# Patient Record
Sex: FEMALE | Race: WHITE | NOT HISPANIC OR LATINO | Employment: FULL TIME | URBAN - METROPOLITAN AREA
[De-identification: names, ages, dates, MRNs, and addresses within clinical notes are randomized per-mention and may not be internally consistent; named-entity substitution may affect disease eponyms.]

---

## 2017-03-03 ENCOUNTER — MYC MEDICAL ADVICE (OUTPATIENT)
Dept: FAMILY MEDICINE | Facility: CLINIC | Age: 42
End: 2017-03-03

## 2017-03-03 DIAGNOSIS — Z13.1 SCREENING FOR DIABETES MELLITUS: ICD-10-CM

## 2017-03-03 DIAGNOSIS — Z13.6 CARDIOVASCULAR SCREENING; LDL GOAL LESS THAN 160: Primary | ICD-10-CM

## 2017-03-03 NOTE — TELEPHONE ENCOUNTER
Writer responded as per below.    Lipids and glucose pended.    Routing to PCP.    Dr. Chiang-Please review and sign if agree.    Thank you!  TON VitaleN, RN

## 2017-03-09 ENCOUNTER — OFFICE VISIT (OUTPATIENT)
Dept: FAMILY MEDICINE | Facility: CLINIC | Age: 42
End: 2017-03-09
Payer: COMMERCIAL

## 2017-03-09 VITALS
SYSTOLIC BLOOD PRESSURE: 153 MMHG | WEIGHT: 228 LBS | BODY MASS INDEX: 37.99 KG/M2 | HEIGHT: 65 IN | RESPIRATION RATE: 16 BRPM | TEMPERATURE: 98.7 F | OXYGEN SATURATION: 97 % | DIASTOLIC BLOOD PRESSURE: 94 MMHG | HEART RATE: 81 BPM

## 2017-03-09 DIAGNOSIS — Z13.6 CARDIOVASCULAR SCREENING; LDL GOAL LESS THAN 160: ICD-10-CM

## 2017-03-09 DIAGNOSIS — Z00.00 ROUTINE GENERAL MEDICAL EXAMINATION AT A HEALTH CARE FACILITY: Primary | ICD-10-CM

## 2017-03-09 DIAGNOSIS — R03.0 ELEVATED BLOOD PRESSURE READING WITHOUT DIAGNOSIS OF HYPERTENSION: ICD-10-CM

## 2017-03-09 DIAGNOSIS — L91.8 SKIN TAG: ICD-10-CM

## 2017-03-09 DIAGNOSIS — E66.9 OBESITY, UNSPECIFIED OBESITY SEVERITY, UNSPECIFIED OBESITY TYPE: ICD-10-CM

## 2017-03-09 PROCEDURE — 99396 PREV VISIT EST AGE 40-64: CPT | Performed by: FAMILY MEDICINE

## 2017-03-09 NOTE — PATIENT INSTRUCTIONS
1. Schedule fasting lab draw.  2. Schedule with dermatology.    Preventive Health Recommendations  Female Ages 40 to 49    Yearly exam:     See your health care provider every year in order to  1. Review health changes.   2. Discuss preventive care.    3. Review your medicines if your doctor prescribed any.      Get a Pap test every three years (unless you have an abnormal result and your provider advises testing more often).      If you get Pap tests with HPV test, you only need to test every 5 years, unless you have an abnormal result. You do not need a Pap test if your uterus was removed (hysterectomy) and you have not had cancer.      You should be tested each year for STDs (sexually transmitted diseases), if you're at risk.       Ask your doctor if you should have a mammogram.      Have a colonoscopy (test for colon cancer) if someone in your family has had colon cancer or polyps before age 50.       Have a cholesterol test every 5 years.       Have a diabetes test (fasting glucose) after age 45. If you are at risk for diabetes, you should have this test every 3 years.    Shots: Get a flu shot each year. Get a tetanus shot every 10 years.     Nutrition:     Eat at least 5 servings of fruits and vegetables each day.    Eat whole-grain bread, whole-wheat pasta and brown rice instead of white grains and rice.    Talk to your provider about Calcium and Vitamin D.     Lifestyle    Exercise at least 150 minutes a week (an average of 30 minutes a day, 5 days a week). This will help you control your weight and prevent disease.    Limit alcohol to one drink per day.    No smoking.     Wear sunscreen to prevent skin cancer.    See your dentist every six months for an exam and cleaning.

## 2017-03-09 NOTE — NURSING NOTE
"Chief Complaint   Patient presents with     Physical       Initial /90 (BP Location: Right arm, Patient Position: Chair, Cuff Size: Adult Large)  Pulse 81  Temp 98.7  F (37.1  C) (Tympanic)  Resp 16  Ht 5' 4.5\" (1.638 m)  Wt 228 lb (103.4 kg)  LMP 02/25/2017  SpO2 97%  BMI 38.53 kg/m2 Estimated body mass index is 38.53 kg/(m^2) as calculated from the following:    Height as of this encounter: 5' 4.5\" (1.638 m).    Weight as of this encounter: 228 lb (103.4 kg).  Medication Reconciliation: complete       Mikala Patel MA     "

## 2017-03-09 NOTE — MR AVS SNAPSHOT
After Visit Summary   3/9/2017    Long Maradiaga    MRN: [id number]           Patient Information     Date Of Birth          1975        Visit Information        Provider Department      3/9/2017 11:20 AM Hailey Chiang MD [facility]        Today's Diagnoses     Routine general medical examination at a health care facility    -  1    CARDIOVASCULAR SCREENING; LDL GOAL LESS THAN 160        Obesity, unspecified obesity severity, unspecified obesity type        Skin tag          Care Instructions    1. Schedule fasting lab draw.  2. Schedule with dermatology.    Preventive Health Recommendations  Female Ages 40 to 49    Yearly exam:     See your health care provider every year in order to  1. Review health changes.   2. Discuss preventive care.    3. Review your medicines if your doctor prescribed any.      Get a Pap test every three years (unless you have an abnormal result and your provider advises testing more often).      If you get Pap tests with HPV test, you only need to test every 5 years, unless you have an abnormal result. You do not need a Pap test if your uterus was removed (hysterectomy) and you have not had cancer.      You should be tested each year for STDs (sexually transmitted diseases), if you're at risk.       Ask your doctor if you should have a mammogram.      Have a colonoscopy (test for colon cancer) if someone in your family has had colon cancer or polyps before age 50.       Have a cholesterol test every 5 years.       Have a diabetes test (fasting glucose) after age 45. If you are at risk for diabetes, you should have this test every 3 years.    Shots: Get a flu shot each year. Get a tetanus shot every 10 years.     Nutrition:     Eat at least 5 servings of fruits and vegetables each day.    Eat whole-grain bread, whole-wheat pasta and brown rice instead of white grains and rice.    Talk to your provider about Calcium and Vitamin D.     Lifestyle    Exercise at  least 150 minutes a week (an average of 30 minutes a day, 5 days a week). This will help you control your weight and prevent disease.    Limit alcohol to one drink per day.    No smoking.     Wear sunscreen to prevent skin cancer.    See your dentist every six months for an exam and cleaning.        Follow-ups after your visit        Additional Services     DERMATOLOGY REFERRAL       Your provider has referred you to: FMG: [facility] [place] [phone]   [email or web address]  FHN: [facility] [phone]   [email or web address]  FHN: [facility] [place] [phone]  [email or web address]  [facility] [phone]   [email or web address]  [facility] [place] [phone]   [email or web address]    Please be aware that coverage of these services is subject to the terms and limitations of your health insurance plan.  Call member services at your health plan with any benefit or coverage questions.      Please bring the following with you to your appointment:    (1) Any X-Rays, CTs or MRIs which have been performed.  Contact the facility where they were done to arrange for  prior to your scheduled appointment.    (2) List of current medications  (3) This referral request   (4) Any documents/labs given to you for this referral                  Your next 10 appointments already scheduled     Mar 10, 2017  7:45 AM CST   Lab visit with  LAB   [facility] ([facility])    [address] [place] [address] [phone]           Please do not eat 10-12 hours before your appointment if you are coming in fasting for labs on lipids, cholesterol, or glucose (sugar). Does not apply to pregnant women.  Water with medications is okay. Do not drink coffee or other fluids.  If  you have concerns about taking  your medications, please send a message by clicking on Secure Messaging, Message Your Care Team.            Mar 28, 2017  9:00 AM CDT   MA SCREENING DIGITAL BILATERAL with [id number]   [facility] ([facility])    [address]  [facility] [address] [phone]           Do not use any powder, lotion or deodorant under your arms or on your breast. If you do, we will ask you to remove it before your exam.  Wear comfortable, two-piece clothing.  If you have any allergies, tell your care team.  Bring any previous mammograms from other facilities or have them mailed to the breast center. This mammogram location, [facility], now offers 3D mammography. It doesn't replace a screening mammogram and can be done with a regular screening mammogram. It is optional and not all insurances will pay for it. 3D mammography is a special kind of mammogram that produces a three-dimensional image of the breast by using low dose-xrays. 3D allows the radiologist to see the breast tissue differently from 2D, which reduces the chance of repeat testing due to overlapping breast tissue. If you are interested in have a 3D mammogram, please check with your insurance before you arrive for your exam. On the day of your exam you will be asked if you would like 3D imaging.            Apr 11, 2017 10:45 AM CDT   Red Physical Adult with Rosie Lan MD   [facility] ([facility])    [address] [place] [address]  [address]  [facility] [address] [phone]              Who to contact     If you have questions or need follow up information about today's clinic visit or your schedule please contact [facility] directly at [phone].  Normal or non-critical lab and imaging results will be communicated to you by MyChart, letter or phone within 4 business days after the clinic has received the  "results. If you do not hear from us within 7 days, please contact the clinic through Everloop or phone. If you have a critical or abnormal lab result, we will notify you by phone as soon as possible.  Submit refill requests through Everloop or call your pharmacy and they will forward the refill request to us. Please allow 3 business days for your refill to be completed.          Additional Information About Your Visit        Snippetshar[employer]etate Information     Everloop gives you secure access to your electronic health record. If you see a primary care provider, you can also send messages to your care team and make appointments. If you have questions, please call your primary care clinic.  If you do not have a primary care provider, please call [phone] and they will assist you.        Care EveryWhere ID     This is your Care EveryWhere ID. This could be used by other organizations to access your [facility] medical records  [id number]        Your Vitals Were     Pulse Temperature Respirations Height Last Period Pulse Oximetry    81 98.7  F (37.1  C) (Tympanic) 16 5' 4.5\" (1.638 m) 02/25/2017 97%    BMI (Body Mass Index)                   38.53 kg/m2            Blood Pressure from Last 3 Encounters:   03/09/17 140/90   10/06/15 (!) 160/110   09/16/15 132/86    Weight from Last 3 Encounters:   03/09/17 228 lb (103.4 kg)   10/06/15 227 lb 6.4 oz (103.1 kg)   09/16/15 217 lb (98.4 kg)              We Performed the Following     DERMATOLOGY REFERRAL        Primary Care Provider Office Phone # Fax #    Hailey Chiang [phone]       [facility] [address]  [facility] [address]        Thank you!     Thank you for choosing [facility]  for your care. Our goal is always to provide you with excellent care. Hearing back from our patients is one way we can continue to improve our services. Please take a few minutes to complete the written survey that you may receive in the mail after your visit " with us. Thank you!             Your Updated Medication List - Protect others around you: Learn how to safely use, store and throw away your medicines at www.disposemymeds.org.      Notice  As of 3/9/2017 11:54 AM    You have not been prescribed any medications.

## 2017-03-09 NOTE — PROGRESS NOTES
SUBJECTIVE:     CC: Long Maradiaga is an 41 year old woman who presents for preventive health visit.     Physical   Annual:     Getting at least 3 servings of Calcium per day::  Yes    Bi-annual eye exam::  Yes    Dental care twice a year::  Yes    Sleep apnea or symptoms of sleep apnea::  Excessive snoring    Diet::  Regular (no restrictions)    Frequency of exercise::  2-3 days/week    Duration of exercise::  30-45 minutes    Taking medications regularly::  Yes    Medication side effects::  None    Additional concerns today::  No    Answers for HPI/ROS submitted by the patient on 3/9/2017   Q1: Little interest or pleasure in doing things: 0=Not at all  Q2: Feeling down, depressed or hopeless: 0=Not at all  PHQ-2 Score: 0    She has started seeing a - diet, exercise, weight ins, and total body fat check ins. Is highly motivated to lose weight and feel healthier.    She has multiple skin tags and a couple that are bothersome; one on her left wrist and one at her bra line that rubs.       Today's PHQ-2 Score:   PHQ-2 ( 1999 Pfizer) 3/9/2017   Little interest or pleasure in doing things Not at all   Feeling down, depressed or hopeless Not at all   PHQ-2 Score 0     Abuse: Current or Past(Physical, Sexual or Emotional)- No  Do you feel safe in your environment - Yes    Social History   Substance Use Topics     Smoking status: Never Smoker     Smokeless tobacco: Never Used     Alcohol use 0.0 oz/week     0 Standard drinks or equivalent per week      Comment: 6 per week     The patient does not drink >3 drinks per day nor >7 drinks per week.    Recent Labs   Lab Test  03/24/15   1214  04/02/14   0826   CHOL  185  191   HDL  46*  35*   LDL  105  107   TRIG  168*  246*   CHOLHDLRATIO  4.0  5.4*   Reviewed orders with patient.  Reviewed health maintenance and updated orders accordingly - Yes    Mammo Decision Support:  Patient under age 50, mutual decision reflected in health maintenance.  Pertinent  mammograms are reviewed under the imaging tab.  History of abnormal Pap smear:   YES - updated in Problem List and Health Maintenance accordingly  Last 3 Pap Results:   PAP (no units)   Date Value   09/16/2015 NIL   04/02/2014 ASC-US (A)   Reviewed and updated as needed this visit by clinical staff  Reviewed and updated as needed this visit by Provider    Past Medical History   Diagnosis Date     ASCUS with positive high risk HPV 4/2014     +HPV33/45, colp neg     Cervical high risk HPV (human papillomavirus) test positive 9/2015     NIL pap, + HPV (not 16 or 18)      Past Surgical History   Procedure Laterality Date     Knee surgery       x 2 1995 and 2004 for ACL and MCL     Kidney surgery       as a baby, corrective kidney and bladder surgery       ROS:   ROS: 10 point ROS neg other than the symptoms noted above in the HPI.    This document serves as a record of the services and decisions personally performed and made by Hailey Chiang MD. It was created on his/her behalf by Mya Felix, trained medical scribe. The creation of this document is based the provider's statements to the medical scribes.    Scribe Mya Felix, March 9, 2017    BP Readings from Last 3 Encounters:   03/09/17 (!) 153/94   10/06/15 (!) 160/110   09/16/15 132/86    Wt Readings from Last 3 Encounters:   03/09/17 103.4 kg (228 lb)   10/06/15 103.1 kg (227 lb 6.4 oz)   09/16/15 98.4 kg (217 lb)         Patient Active Problem List   Diagnosis     harper JOINT PAIN-LOWER LEG     CARDIOVASCULAR SCREENING; LDL GOAL LESS THAN 160     ASCUS with positive high risk HPV     Obesity     Past Surgical History   Procedure Laterality Date     Knee surgery       x 2 1995 and 2004 for ACL and MCL     Kidney surgery       as a baby, corrective kidney and bladder surgery       Social History   Substance Use Topics     Smoking status: Never Smoker     Smokeless tobacco: Never Used     Alcohol use 0.0 oz/week     0 Standard drinks or equivalent per week  "     Comment: 6 per week     Family History   Problem Relation Age of Onset     DIABETES Mother          No current outpatient prescriptions on file.     No Known Allergies  Recent Labs   Lab Test  03/24/15   1214  04/02/14   0826   LDL  105  107   HDL  46*  35*   TRIG  168*  246*   TSH   --   2.41      OBJECTIVE:     BP (!) 153/94  Pulse 81  Temp 98.7  F (37.1  C) (Tympanic)  Resp 16  Ht 1.638 m (5' 4.5\")  Wt 103.4 kg (228 lb)  LMP 02/25/2017  SpO2 97%  BMI 38.53 kg/m2  EXAM:  GENERAL: healthy, alert and no distress  EYES: Eyes grossly normal to inspection, PERRL and conjunctivae and sclerae normal  HENT: ear canals and TM's normal, nose and mouth without ulcers or lesions  NECK: no adenopathy, no asymmetry, masses, or scars and thyroid normal to palpation  RESP: lungs clear to auscultation - no rales, rhonchi or wheezes  BREAST: normal without masses, tenderness or nipple discharge and no palpable axillary masses or adenopathy  CV: regular rate and rhythm, normal S1 S2, no S3 or S4, no murmur, click or rub, no peripheral edema and peripheral pulses strong  ABDOMEN: soft, nontender, no hepatosplenomegaly, no masses and bowel sounds normal  MS: no gross musculoskeletal defects noted, no edema  SKIN: no suspicious lesions or rashes  NEURO: Normal strength and tone, mentation intact and speech normal  PSYCH: mentation appears normal, affect normal/bright    ASSESSMENT/PLAN:     1. Routine general medical examination at a health care facility  -- MAMMO scheduled  -- PAP scheduled with Dr. Lan     2. CARDIOVASCULAR SCREENING; LDL GOAL LESS THAN 160  Has lab draw scheduled.     3. Obesity, unspecified obesity severity, unspecified obesity type  Checking glucose at next lab draw. She has started a program with a  at her gym and is motivated to lose weight through diet and exercise. She would like to check back with me at some interval to keep her on track with her weight loss efforts. Discussed making an " "apt in three months.      4. Skin tag  She has multiple skin tags that she would like removed.   - DERMATOLOGY REFERRAL    5. Elevated blood pressure without HTN  Will recheck bp today and if still elevated recheck with MA in the next couple of weeks. Pt wishes to attempt diet and lifestyle changes before starting medicaction at that time if still high.     COUNSELING:  Reviewed preventive health counseling, as reflected in patient instructions  BP Screening:   Last 3 BP Readings:    BP Readings from Last 3 Encounters:   03/09/17 (!) 153/94   10/06/15 (!) 160/110   09/16/15 132/86   The following was recommended to the patient:  Recommend lifestyle modifications   reports that she has never smoked. She has never used smokeless tobacco.  Estimated body mass index is 38.53 kg/(m^2) as calculated from the following:    Height as of this encounter: 1.638 m (5' 4.5\").    Weight as of this encounter: 103.4 kg (228 lb).   Weight management plan: diet and exercise    Counseling Resources:  ATP IV Guidelines  Pooled Cohorts Equation Calculator  Breast Cancer Risk Calculator  FRAX Risk Assessment  ICSI Preventive Guidelines  Dietary Guidelines for Americans, 2010  USDA's MyPlate  ASA Prophylaxis  Lung CA Screening    The information in this document, created by the medical scribe for me, accurately reflects the services I personally performed and the decisions made by me. I have reviewed and approved this document for accuracy prior to leaving the patient care area. 03/09/17    Hailey Chiang MD  [facility]      "

## 2017-03-10 DIAGNOSIS — Z13.1 SCREENING FOR DIABETES MELLITUS: ICD-10-CM

## 2017-03-10 DIAGNOSIS — Z13.6 CARDIOVASCULAR SCREENING; LDL GOAL LESS THAN 160: ICD-10-CM

## 2017-03-10 LAB
CHOLEST SERPL-MCNC: 187 MG/DL
GLUCOSE SERPL-MCNC: 99 MG/DL (ref 70–99)
HDLC SERPL-MCNC: 39 MG/DL
LDLC SERPL CALC-MCNC: 109 MG/DL
NONHDLC SERPL-MCNC: 148 MG/DL
TRIGL SERPL-MCNC: 196 MG/DL

## 2017-03-10 PROCEDURE — 80061 LIPID PANEL: CPT | Performed by: FAMILY MEDICINE

## 2017-03-10 PROCEDURE — 82947 ASSAY GLUCOSE BLOOD QUANT: CPT | Performed by: FAMILY MEDICINE

## 2017-03-10 PROCEDURE — 36415 COLL VENOUS BLD VENIPUNCTURE: CPT | Performed by: FAMILY MEDICINE

## 2017-03-28 ENCOUNTER — HOSPITAL ENCOUNTER (OUTPATIENT)
Dept: MAMMOGRAPHY | Facility: CLINIC | Age: 42
Discharge: HOME | End: 2017-03-28
Attending: FAMILY MEDICINE | Admitting: FAMILY MEDICINE
Payer: COMMERCIAL

## 2017-03-28 DIAGNOSIS — Z00.00 PREVENTATIVE HEALTH CARE: ICD-10-CM

## 2017-03-28 PROCEDURE — 77063 BREAST TOMOSYNTHESIS BI: CPT

## 2017-03-28 PROCEDURE — G0202 SCR MAMMO BI INCL CAD: HCPCS

## 2017-09-03 ENCOUNTER — HEALTH MAINTENANCE LETTER (OUTPATIENT)
Age: 42
End: 2017-09-03

## 2018-03-05 ENCOUNTER — TELEPHONE (OUTPATIENT)
Dept: OBGYN | Facility: CLINIC | Age: 43
End: 2018-03-05

## 2018-03-05 NOTE — TELEPHONE ENCOUNTER
Pt is past due for f/u pap smear.  Reminder letter was sent 05/05/17.  LMTC and schedule at [facility].  Left this writer's number in case of questions ([phone]).  If no reply and/or appt within 2 weeks (03/19/18) pt will be considered lost to pap tracking f/u.  Halima Alamo,    Pap Tracking

## 2018-09-10 ENCOUNTER — HEALTH MAINTENANCE LETTER (OUTPATIENT)
Age: 43
End: 2018-09-10

## 2019-04-04 ENCOUNTER — RESULT FOLLOW UP (OUTPATIENT)
Dept: OBGYN | Facility: CLINIC | Age: 44
End: 2019-04-04

## 2019-04-04 ENCOUNTER — OFFICE VISIT (OUTPATIENT)
Dept: OBGYN | Facility: CLINIC | Age: 44
End: 2019-04-04
Payer: COMMERCIAL

## 2019-04-04 VITALS
HEART RATE: 78 BPM | OXYGEN SATURATION: 98 % | WEIGHT: 198 LBS | HEIGHT: 64 IN | DIASTOLIC BLOOD PRESSURE: 98 MMHG | SYSTOLIC BLOOD PRESSURE: 152 MMHG | BODY MASS INDEX: 33.8 KG/M2

## 2019-04-04 DIAGNOSIS — R87.610 ASCUS OF CERVIX WITH NEGATIVE HIGH RISK HPV: ICD-10-CM

## 2019-04-04 DIAGNOSIS — Z01.419 ENCOUNTER FOR GYNECOLOGICAL EXAMINATION WITHOUT ABNORMAL FINDING: Primary | ICD-10-CM

## 2019-04-04 DIAGNOSIS — Z12.4 SCREENING FOR MALIGNANT NEOPLASM OF CERVIX: ICD-10-CM

## 2019-04-04 PROCEDURE — G0145 SCR C/V CYTO,THINLAYER,RESCR: HCPCS | Performed by: OBSTETRICS & GYNECOLOGY

## 2019-04-04 PROCEDURE — 87624 HPV HI-RISK TYP POOLED RSLT: CPT | Performed by: OBSTETRICS & GYNECOLOGY

## 2019-04-04 PROCEDURE — 99386 PREV VISIT NEW AGE 40-64: CPT | Performed by: OBSTETRICS & GYNECOLOGY

## 2019-04-04 ASSESSMENT — MIFFLIN-ST. JEOR: SCORE: 1533.12

## 2019-04-04 NOTE — LETTER
July 22, 2019      Long Hiren  [address] [facility] [address]      Dear Ms. Maradiaga,    At [facility], your health and wellness is our primary concern. That is why we are following up on a colposcopy  from 05/23/19.  Your Provider had recommended that you have a colposcopy at Dr. Mirella Burgos's cervical dysplasia clinic at the [facility] completed by 08/23/19. Our records do not show that this has been scheduled.    It is important to complete the follow up that your provider has suggested for you to ensure that there are no worsening changes which may, over time, develop into cancer.      Please call [phone] to schedule an appointment for a Colposcopy (this cannot be scheduled through [facility]) with Dr. Burgos at your earliest convenience. If you have questions or concerns, please call the clinic and we will be happy to assist you.    If you have completed the tests outside of [facility], please have the results forwarded to our office. We will update the chart for your primary Physician to review before your next annual physical.     Thank you for choosing [facility]!    Sincerely,      Your [facility] Care Team/[facility]

## 2019-04-04 NOTE — PROGRESS NOTES
Long Maradiaga is a 43 year old  female who presents for annual exam.  See problem list regarding abnormal Pap.  The narrow adolescent speculum was used to visualize the cervix.  Limited visualization was achieved but Pap was sent.  The cervix was palpably small, posterior in the vagina.  She has a mammogram scheduled in 2 weeks.    Menses are regular q 28-30 days and normal lasting 5 days.  Menses flow: normal.  Patient's last menstrual period was 2019 (approximate).. Using nothing.  She is not currently considering pregnancy.  Besides routine health maintenance, she has no other health concerns today .  GYNECOLOGIC HISTORY:  Menarche:     Long is sexually active with 1male partner(s) and is currently in monogamous relationship with .    History sexually transmitted infections:No STD history  STI testing offered?  Declined  BERHANE exposure: No  History of abnormal Pap smear: NO - age 30- 65 PAP every 3 years recommended  Family history of breast CA: Yes (Please explain): m grandmother  Family history of uterine/ovarian CA: No    Family history of colon CA: Yes (Please explain): m grandfather    HEALTH MAINTENANCE:  Cholesterol: (  Cholesterol   Date Value Ref Range Status   03/10/2017 187 <200 mg/dL Final   2015 185 <200 mg/dL Final     Comment:     LDL Cholesterol is the primary guide to therapy.   The NCEP recommends further evaluation of: patients with cholesterol greater   than 200 mg/dL if additional risk factors are present, cholesterol greater   than   240 mg/dL, triglycerides greater than 150 mg/dL, or HDL less than 40 mg/dL.      History of abnormal lipids: No  Mammo:  . History of abnormal Mammo: Not applicable.  Regular Self Breast Exams: Yes  Calcium/Vitamin D intake: source:  dairy, dietary supplement(s) Adequate? Yes  TSH: (  TSH   Date Value Ref Range Status   2014 2.41 0.4 - 5.0 mU/L Final    )  Pap; (  Lab Results   Component Value Date    PAP NIL 2015    PAP ASC-US  2014    )    HISTORY:  Obstetric History       T0      L0     SAB0   TAB0   Ectopic0   Multiple0   Live Births0         Past Medical History:   Diagnosis Date     ASCUS with positive high risk HPV 2014    +HPV33/45, colp neg     Cervical high risk HPV (human papillomavirus) test positive 2015    NIL pap, + HPV (not 16 or 18)     Past Surgical History:   Procedure Laterality Date     KIDNEY SURGERY      as a baby, corrective kidney and bladder surgery     KNEE SURGERY      x 2  and  for ACL and MCL     Family History   Problem Relation Age of Onset     Diabetes Mother      Social History     Socioeconomic History     Marital status:      Spouse name: None     Number of children: None     Years of education: None     Highest education level: None   Occupational History     None   Social Needs     Financial resource strain: None     Food insecurity:     Worry: None     Inability: None     Transportation needs:     Medical: None     Non-medical: None   Tobacco Use     Smoking status: Never Smoker     Smokeless tobacco: Never Used   Substance and Sexual Activity     Alcohol use: Yes     Alcohol/week: 0.0 oz     Comment: 6 per week     Drug use: No     Sexual activity: Yes     Partners: Male     Birth control/protection: Condom   Lifestyle     Physical activity:     Days per week: None     Minutes per session: None     Stress: None   Relationships     Social connections:     Talks on phone: None     Gets together: None     Attends Protestant service: None     Active member of club or organization: None     Attends meetings of clubs or organizations: None     Relationship status: None     Intimate partner violence:     Fear of current or ex partner: None     Emotionally abused: None     Physically abused: None     Forced sexual activity: None   Other Topics Concern     Parent/sibling w/ CABG, MI or angioplasty before 65F 55M? No   Social History Narrative     None     No current  "outpatient medications on file.   No Known Allergies    Past medical, surgical, social and family history were reviewed and updated in EPIC.    ROS:   C:     NEGATIVE for fever, chills, change in weight  I:       NEGATIVE for worrisome rashes, moles or lesions  E:     NEGATIVE for vision changes or irritation  E/M: NEGATIVE for ear, mouth and throat problems  R:     NEGATIVE for significant cough or SOB  CV:   NEGATIVE for chest pain, palpitations or peripheral edema  GI:     NEGATIVE for nausea, abdominal pain, heartburn, or change in bowel habits  :   NEGATIVE for frequency, dysuria, hematuria, vaginal discharge, or irregular bleeding  M:     NEGATIVE for significant arthralgias or myalgia  N:      NEGATIVE for weakness, dizziness or paresthesias  E:      NEGATIVE for temperature intolerance, skin/hair changes  P:      NEGATIVE for changes in mood or affect.    EXAM:  BP (!) 152/98   Pulse 78   Ht 1.626 m (5' 4\")   Wt 89.8 kg (198 lb)   LMP 03/20/2019 (Approximate)   SpO2 98%   Breastfeeding? No   BMI 33.99 kg/m     BMI: Body mass index is 33.99 kg/m .  Constitutional: healthy, alert and no distress  Head: Normocephalic. No masses, lesions, tenderness or abnormalities  Neck: Neck supple. Trachea midline. No adenopathy. Thyroid symmetric, normal size.   Cardiovascular: RRR.   Respiratory: Negative.   Breast: No nodularity, asymmetry or nipple discharge bilaterally.  Gastrointestinal: Abdomen soft, non-tender, non-distended. No masses, organomegaly.  :  Vulva:  No external lesions, normal female hair distribution, no inguinal adenopathy.    Urethra:  Midline, non-tender, well supported, no discharge  Vagina:  Moist, pink, no abnormal discharge, no lesions  Uterus:  Normal size, non-tender, freely mobile  Ovaries:  No masses appreciated, non-tender, mobile  Rectal Exam:   Musculoskeletal: extremities normal  Skin: no suspicious lesions or rashes  Psychiatric: Affect appropriate, cooperative,mentation " appears normal.     COUNSELING:      reports that  has never smoked. she has never used smokeless tobacco.    Body mass index is 33.99 kg/m .  Weight management plan: Diet and exercise  FRAX Risk Assessment    ASSESSMENT:  43 year old female with satisfactory annual exam  (Z12.4) Screening for malignant neoplasm of cervix  (primary encounter diagnosis)  Comment:   Plan: Pap imaged thin layer screen with HPV -         recommended age 30 - 65 years (select HPV order        below), HPV High Risk Types DNA Cervical

## 2019-04-09 LAB
COPATH REPORT: NORMAL
PAP: NORMAL

## 2019-04-11 LAB
FINAL DIAGNOSIS: ABNORMAL
HPV HR 12 DNA CVX QL NAA+PROBE: POSITIVE
HPV16 DNA SPEC QL NAA+PROBE: NEGATIVE
HPV18 DNA SPEC QL NAA+PROBE: NEGATIVE
SPECIMEN DESCRIPTION: ABNORMAL
SPECIMEN SOURCE CVX/VAG CYTO: ABNORMAL

## 2019-04-11 NOTE — PROGRESS NOTES
4/2/14: ASCUS, +HPV 33/45.   4/2014 [place]: neg, cotest one year-in reminders  9/16/15: NIL pap, + HPV (not 16 or 18)   10/6/15: [place]: benign.  Pap/HPV in 1 year  03/21/18 Patient is lost to follow-up.   04/4/19: NIL pap, + HR HPV (not 16 or 18) result. Plan [place], due bef 07/4/19.   04/11/19: Msg left to call back. Pt was advised.  05/23/19: [place] cervix not able to be fully visualized. Plan [place] with Dr. Mirella Hill's cervical dysplasia clinic at the [facility]. Provider informed the pt.   07/23/19 Livestream colp reminder message sent. ([facility])  08/27/19: [place] Bx and ECC Benign done at Gyn/Onc. Plan provider review.   09/11/19: Telephone encounter sent to the provider to review and advise.   09/18/19: 2nd request sent to Dr. Burgos.  09/25/19: 3rd request sent high priority to Dr. Burgos.  08/27/19: [place] Bx and ECC Benign done at Gyn/Onc. Plan cotest in 1 year. Provider advised the pt of the results and recommendations via [employer]. Pt viewed [employer] message.

## 2019-05-23 ENCOUNTER — OFFICE VISIT (OUTPATIENT)
Dept: OBGYN | Facility: CLINIC | Age: 44
End: 2019-05-23
Payer: COMMERCIAL

## 2019-05-23 VITALS — HEART RATE: 87 BPM | BODY MASS INDEX: 33.81 KG/M2 | WEIGHT: 197 LBS | OXYGEN SATURATION: 96 %

## 2019-05-23 DIAGNOSIS — B97.7 HPV IN FEMALE: Primary | ICD-10-CM

## 2019-05-23 LAB — HCG UR QL: NEGATIVE

## 2019-05-23 PROCEDURE — 81025 URINE PREGNANCY TEST: CPT | Performed by: OBSTETRICS & GYNECOLOGY

## 2019-05-23 PROCEDURE — 99207 ZZC NO BILLABLE SERVICE THIS VISIT: CPT | Performed by: OBSTETRICS & GYNECOLOGY

## 2019-05-23 NOTE — PATIENT INSTRUCTIONS

## 2019-05-23 NOTE — Clinical Note
I don't think she should be charged for this visit, she will have to have a colp at the , all I did today was try to see the cervix.

## 2019-05-23 NOTE — PROGRESS NOTES
INDICATION: Long Maradiaga is a 43 year old female who presents for colposcopy.  Pap smear was reported as NIL, other HPV +.  See problem list.       Informed consent obtained for procedure.               COLPOSCOPIC EXAM: An attempt was made to visualize the cervix, initially using the narrow adolescent speculum, then progressing to the long Philip speculum.  She was able to tolerate both of these speculums without discomfort.  The cervix could be palpated in the vagina, was palpably small, and the symphysis pubis was prominent.  Despite attempts, the cervix was not fully visualized, and the procedure was stopped.  She tolerated the attempts well.    ASSESSMENT: NIL pap, other HPV +.  Failed colposcopy.     PLAN: We discussed options.  She will schedule colposcopy at Dr. Mirella Hill's cervical dysplasia clinic at the [facility].

## 2019-07-24 NOTE — TELEPHONE ENCOUNTER
ONCOLOGY INTAKE: Records Information      APPT INFORMATION:  Referring provider:  Dr. Rosie Lan  Referring provider s clinic:  [facility]  Reason for visit/diagnosis:  HPV in female, failed colposcopy   Has patient been notified of appointment date and time?: yes, per pt    RECORDS INFORMATION:  Were the records received with the referral (via [employer])? no    Has patient been seen for any external appt for this diagnosis? no    If yes, where? na    Has patient had any imaging or procedures outside of [place] for this condition? no      If Yes, where? na    ADDITIONAL INFORMATION:  Patient was being referred specifically to Dr. Burgos

## 2019-08-16 DIAGNOSIS — Z12.31 VISIT FOR SCREENING MAMMOGRAM: ICD-10-CM

## 2019-08-16 PROCEDURE — 77063 BREAST TOMOSYNTHESIS BI: CPT | Mod: TC

## 2019-08-16 PROCEDURE — 77067 SCR MAMMO BI INCL CAD: CPT | Mod: TC

## 2019-08-26 ENCOUNTER — CARE COORDINATION (OUTPATIENT)
Dept: ONCOLOGY | Facility: CLINIC | Age: 44
End: 2019-08-26

## 2019-08-26 DIAGNOSIS — Z01.812 PRE-PROCEDURE LAB EXAM: Primary | ICD-10-CM

## 2019-08-26 ASSESSMENT — ENCOUNTER SYMPTOMS
HOARSE VOICE: 0
NECK MASS: 0
HEARTBURN: 0
TROUBLE SWALLOWING: 0
TASTE DISTURBANCE: 0
SINUS PAIN: 1
BLOATING: 0
SMELL DISTURBANCE: 0
SINUS CONGESTION: 1
JAUNDICE: 0
DIARRHEA: 0
NAUSEA: 0
VOMITING: 0
RECTAL PAIN: 0
BOWEL INCONTINENCE: 0
BLOOD IN STOOL: 0
CONSTIPATION: 1
SORE THROAT: 1
ABDOMINAL PAIN: 0

## 2019-08-26 NOTE — PROGRESS NOTES
Consult Notes on Referred Patient    Date: 2019       Dr. Rosie Lan MD  [address] [address]       RE: Long Maradiaga  : 1975  NI: 2019    Dear Dr. Rosie Lan:    I had the pleasure of seeing your patient Long Maradiaga here at the Gynecologic Cancer Clinic at the [facility] on 2019.  As you know she is a very pleasant 43 year old woman with a history of HPV.  Given these findings she was subsequently sent to the Gynecologic Cancer Clinic for new patient consultation.     Cervical dysplasia history:  14: ASCUS, +HPV 33/45.   2014 [place]: neg, cotest one year-in reminders  9/16/15: NIL pap, + HPV (not 16 or 18)   10/6/15: [place]: benign.  Pap/HPV in 1 year  18 Patient is lost to follow-up.   19: NIL pap, + HR HPV (not 16 or 18) result. Plan [place].   19: Colpo cervix not able to be fully visualized. Plan [place] with Dr. Mirella Burgos's cervical dysplasia clinic at the [facility].     Today Long reports she is doing well. She has no complaints. She is premenopausal with regular periods. She denies intermenstrual bleeding.    Review of Systems:    Systemic           no weight changes; no fever; no chills; no night sweats; no appetite changes  Skin           no rashes, or lesions  Eye           no irritation; no changes in vision  Leana-Laryngeal           no dysphagia; no hoarseness   Pulmonary    no cough; no shortness of breath  Cardiovascular    no chest pain; no palpitations  Gastrointestinal    no diarrhea; no constipation; no abdominal pain; no changes in bowel  habits; no blood in stool  Genitourinary   no urinary frequency; no urinary urgency; no dysuria; no pain; no abnormal vaginal discharge; no abnormal vaginal bleeding  Breast   no breast discharge; no breast changes; no breast pain  Musculoskeletal    no myalgias; no arthralgias; no back pain  Psychiatric           no depressed mood; no anxiety     Hematologic           no tender lymph nodes; no noticeable swellings or lumps   Endocrine    no hot flashes; no heat/cold intolerance         Neurological   no tremor; no numbness and tingling; no headaches; no difficulty  sleeping      Past Medical History:    Past Medical History:   Diagnosis Date     ASCUS with positive high risk HPV 2014    +HPV33/45, colp neg     Cervical high risk HPV (human papillomavirus) test positive 2015, 19    NIL pap, + HPV (not 16 or 18)         Past Surgical History:    Past Surgical History:   Procedure Laterality Date     KIDNEY SURGERY      as a baby, corrective kidney and bladder surgery     KNEE SURGERY      x 2  and  for ACL and MCL         Health Maintenance:  Health Maintenance Due   Topic Date Due     HIV SCREENING  1989     PHQ-2  2019       Last Pap Smear: 2019              Result: normal, +HRHPV  She has had a history of abnormal Pap smears.    Last Mammogram: 2019              Result: normal      She has not had a history of abnormal mammograms.    Last Colonoscopy: none              Result: not done                    Last DEXA Scan: none              Result: not done    Last Diabetes Screening; 3/10/2017    Last Thyroid Screenin/2014     Last Cholest Screening:      3/10/2017, Tg borderline high, HDL low, LDL above desirable      Current Medications:     currently has no medications in their medication list.       Allergies:    No Known Allergies      Social History:     Social History     Tobacco Use     Smoking status: Never Smoker     Smokeless tobacco: Never Used   Substance Use Topics     Alcohol use: Yes     Alcohol/week: 0.0 oz     Comment: none in 2 years       History   Drug Use No       Family History:     The patient's family history is notable for.    Family History   Problem Relation Age of Onset     Diabetes Mother          Physical Exam:     /87   Pulse 76   Temp 99.1  F (37.3  C) (Oral)   Resp 16   " Ht 1.626 m (5' 4\")   Wt 89.8 kg (198 lb)   LMP 08/13/2019 (Exact Date)   SpO2 98%   Breastfeeding? No   BMI 33.99 kg/m    Body mass index is 33.99 kg/m .    General Appearance: healthy and alert, no distress     HEENT:  no thyromegaly, no palpable nodules or masses        Cardiovascular: regular rate and rhythm, no gallops, rubs or murmurs     Respiratory: lungs clear, no rales, rhonchi or wheezes, normal diaphragmatic excursion    Musculoskeletal: extremities non tender and without edema    Skin: no lesions or rashes     Neurological: normal gait, no gross defects     Psychiatric: appropriate mood and affect                               Hematological: normal cervical, supraclavicular and inguinal lymph nodes     Gastrointestinal:       abdomen soft, non-tender, non-distended, no organomegaly or masses    Genitourinary: External genitalia and urethral meatus appears normal.  Vagina is smooth without nodularity or masses.  Cervix small, anterior to the left behind pubic bone.  Bimanual exam reveal no masses, nodularity or fullness.     COLPOSCOPY PROCEDURE NOTE    42 y/o  female presents for colposcopy.  Pap smear on 4/4/2019 was normal and HPV testing was +HRHPV (non 16/18.) Prior to this she has had previous colposcopies with benign results, one ASCUS pap.  Patient does not smoke    Prior cervical/vaginal disease: Normal exam without visible pathology.  Prior cervical treatment: no treatment.      Procedure for colposcopy and biopsy has been explained to the patient.  Consent:  Risks and benefits of treatment were discussed.  Patient's questions were elicited and answered and written consent signed and scanned into medical record.    PROCEDURE:  Speculum placed in vagina and visualization of cervix acheived, cervix swabbed with acetic acid solution. No visible lesions were noted. Endocervical curettage was obtained. Random biopsies were taken. Cervix was then hemostatic.  FINDINGS:  Cervix: no visible " lesions; .    ASSESSMENT: Normal exam without visible pathology.    PLAN:   -Specimens sent to pathology  -Will base further treatment on pathology findings.  -Post biopsy instructions given to patient and  -Will send Pathology results to patient via [employer] when they are available.        Assessment:    Long Maradiaga is a 43 year old woman with +HPVHR, normal pathology.     A total of 60 minutes was spent with the patient, 50 minutes of which were spent in counseling the patient and/or treatment planning.      Plan:       1.)    Colposcopy preformed today. Next steps pending results from ECC and biopsies. If negative/SKYLER I, plan repeat co-testing in one year. If SKYLER II-III or greater, plan excisional procedure (LEEP) in operating room due to difficulty visualizing surgery.      2.) Genetic risk factors were assessed and the patient does not meet the qualifications for a referral.      3.) Labs and/or tests ordered include:  Surgical pathology.     4.) No health maintenance issues addressed today.      Thank you for allowing us to participate in the care of your patient.         Sincerely,    I acted as a scribe for Dr. Burgos.  Porsha Garsia MD  PGY-3 Gyn Onc    Mirella Burgos MD  Gynecologic Oncology  [facility]      CC  Patient Care Team:  Hailey Chiang MD as PCP - General (Family Practice)  Hailey Chiang MD as Assigned PCP  Dianna Bagley MD as MD (OB/Gyn)  Mirella Burgos MD as MD (OB/Gyn)  DIANNA BAGLEY

## 2019-08-26 NOTE — PROGRESS NOTES
RN reached out to patient for pre visit call.      New Patient Pre-Visit Phone Call:    1) Verify time, date & provider:    2) What to bring:  - Medication list and/or bring medication bottles  - List or notebook - write down all questions to address at visit  - Eat and drink as dilip/take all medication as usual   - You may students/residents and or fellows as they are part of the care team    3) What to expect:   - Briefly walk Pt through their day  - Duration (2-4 hrs) Bring reading material. Family members welcome.  - Possible labs, EKG, CXR    4) Records/information  - PCP:   - Referring MD information:    5) Road construction  6) Use  Parking    Patient was updated that she will have a lab appt prior for a urine pregnancy test  Patient was appreciative of the call    Aixa Felix RN

## 2019-08-27 ENCOUNTER — PRE VISIT (OUTPATIENT)
Dept: ONCOLOGY | Facility: CLINIC | Age: 44
End: 2019-08-27

## 2019-08-27 ENCOUNTER — ONCOLOGY VISIT (OUTPATIENT)
Dept: ONCOLOGY | Facility: CLINIC | Age: 44
End: 2019-08-27
Attending: OBSTETRICS & GYNECOLOGY
Payer: COMMERCIAL

## 2019-08-27 VITALS
WEIGHT: 198 LBS | DIASTOLIC BLOOD PRESSURE: 87 MMHG | BODY MASS INDEX: 33.8 KG/M2 | TEMPERATURE: 99.1 F | SYSTOLIC BLOOD PRESSURE: 134 MMHG | RESPIRATION RATE: 16 BRPM | OXYGEN SATURATION: 98 % | HEART RATE: 76 BPM | HEIGHT: 64 IN

## 2019-08-27 DIAGNOSIS — Z01.812 PRE-PROCEDURE LAB EXAM: ICD-10-CM

## 2019-08-27 DIAGNOSIS — R87.810 CERVICAL HIGH RISK HPV (HUMAN PAPILLOMAVIRUS) TEST POSITIVE: Primary | ICD-10-CM

## 2019-08-27 LAB — HCG UR QL: NEGATIVE

## 2019-08-27 PROCEDURE — 57505 ENDOCERVICAL CURETTAGE: CPT | Mod: ZF | Performed by: OBSTETRICS & GYNECOLOGY

## 2019-08-27 PROCEDURE — 81025 URINE PREGNANCY TEST: CPT | Performed by: OBSTETRICS & GYNECOLOGY

## 2019-08-27 PROCEDURE — 99204 OFFICE O/P NEW MOD 45 MIN: CPT | Mod: 25 | Performed by: OBSTETRICS & GYNECOLOGY

## 2019-08-27 PROCEDURE — 88305 TISSUE EXAM BY PATHOLOGIST: CPT | Performed by: OBSTETRICS & GYNECOLOGY

## 2019-08-27 PROCEDURE — G0463 HOSPITAL OUTPT CLINIC VISIT: HCPCS | Mod: ZF

## 2019-08-27 PROCEDURE — 57454 BX/CURETT OF CERVIX W/SCOPE: CPT | Performed by: OBSTETRICS & GYNECOLOGY

## 2019-08-27 PROCEDURE — G0463 HOSPITAL OUTPT CLINIC VISIT: HCPCS | Mod: 25

## 2019-08-27 PROCEDURE — 57455 BIOPSY OF CERVIX W/SCOPE: CPT | Mod: ZF | Performed by: OBSTETRICS & GYNECOLOGY

## 2019-08-27 ASSESSMENT — PAIN SCALES - GENERAL: PAINLEVEL: NO PAIN (0)

## 2019-08-27 ASSESSMENT — MIFFLIN-ST. JEOR: SCORE: 1533.12

## 2019-08-27 NOTE — LETTER
2019       RE: Long Maradiaga  [address] [place] [address]     Dear Colleague,    Thank you for referring your patient, Long Maradiaga, to the [facility]. Please see a copy of my visit note below.                            Consult Notes on Referred Patient    Date: 2019       Dr. Rosie Lan MD  [address] [place] [address]       RE: Long Maradiaga  : 1975  NI: 2019    Dear Dr. Rosie Lan:    I had the pleasure of seeing your patient Long Maradiaga here at the Gynecologic Cancer Clinic at the [facility] on 2019.  As you know she is a very pleasant 43 year old woman with a history of HPV.  Given these findings she was subsequently sent to the Gynecologic Cancer Clinic for new patient consultation.     Cervical dysplasia history:  14: ASCUS, +HPV 33/45.   2014 Gibson: neg, cotest one year-in reminders  9/16/15: NIL pap, + HPV (not 16 or 18)   10/6/15: Gibson: benign.  Pap/HPV in 1 year  18 Patient is lost to follow-up.   19: NIL pap, + HR HPV (not 16 or 18) result. Plan Gibson.   19: Colpo cervix not able to be fully visualized. Plan Gibson with Dr. Mirella Burgos's cervical dysplasia clinic at the [facility].     Today Long reports she is doing well. She has no complaints. She is premenopausal with regular periods. She denies intermenstrual bleeding.    Review of Systems:    Systemic           no weight changes; no fever; no chills; no night sweats; no appetite changes  Skin           no rashes, or lesions  Eye           no irritation; no changes in vision  Leana-Laryngeal           no dysphagia; no hoarseness   Pulmonary    no cough; no shortness of breath  Cardiovascular    no chest pain; no palpitations  Gastrointestinal    no diarrhea; no constipation; no abdominal pain; no changes in bowel  habits; no blood in stool  Genitourinary   no urinary frequency; no urinary urgency; no dysuria; no pain; no abnormal vaginal  discharge; no abnormal vaginal bleeding  Breast   no breast discharge; no breast changes; no breast pain  Musculoskeletal    no myalgias; no arthralgias; no back pain  Psychiatric           no depressed mood; no anxiety    Hematologic           no tender lymph nodes; no noticeable swellings or lumps   Endocrine    no hot flashes; no heat/cold intolerance         Neurological   no tremor; no numbness and tingling; no headaches; no difficulty  sleeping      Past Medical History:    Past Medical History:   Diagnosis Date     ASCUS with positive high risk HPV 2014    +HPV33/45, colp neg     Cervical high risk HPV (human papillomavirus) test positive 2015, 19    NIL pap, + HPV (not 16 or 18)         Past Surgical History:    Past Surgical History:   Procedure Laterality Date     KIDNEY SURGERY      as a baby, corrective kidney and bladder surgery     KNEE SURGERY      x 2  and  for ACL and MCL         Health Maintenance:  Health Maintenance Due   Topic Date Due     HIV SCREENING  1989     PHQ-2  2019       Last Pap Smear: 2019              Result: normal, +HRHPV  She has had a history of abnormal Pap smears.    Last Mammogram: 2019              Result: normal      She has not had a history of abnormal mammograms.    Last Colonoscopy: none              Result: not done                    Last DEXA Scan: none              Result: not done    Last Diabetes Screening; 3/10/2017    Last Thyroid Screenin/2014     Last Cholest Screening:      3/10/2017, Tg borderline high, HDL low, LDL above desirable      Current Medications:     currently has no medications in their medication list.       Allergies:    No Known Allergies      Social History:     Social History     Tobacco Use     Smoking status: Never Smoker     Smokeless tobacco: Never Used   Substance Use Topics     Alcohol use: Yes     Alcohol/week: 0.0 oz     Comment: none in 2 years       History   Drug Use No       Family  "History:     The patient's family history is notable for.    Family History   Problem Relation Age of Onset     Diabetes Mother          Physical Exam:     /87   Pulse 76   Temp 99.1  F (37.3  C) (Oral)   Resp 16   Ht 1.626 m (5' 4\")   Wt 89.8 kg (198 lb)   LMP 08/13/2019 (Exact Date)   SpO2 98%   Breastfeeding? No   BMI 33.99 kg/m     Body mass index is 33.99 kg/m .    General Appearance: healthy and alert, no distress     HEENT:  no thyromegaly, no palpable nodules or masses        Cardiovascular: regular rate and rhythm, no gallops, rubs or murmurs     Respiratory: lungs clear, no rales, rhonchi or wheezes, normal diaphragmatic excursion    Musculoskeletal: extremities non tender and without edema    Skin: no lesions or rashes     Neurological: normal gait, no gross defects     Psychiatric: appropriate mood and affect                               Hematological: normal cervical, supraclavicular and inguinal lymph nodes     Gastrointestinal:       abdomen soft, non-tender, non-distended, no organomegaly or masses    Genitourinary: External genitalia and urethral meatus appears normal.  Vagina is smooth without nodularity or masses.  Cervix small, anterior to the left behind pubic bone.  Bimanual exam reveal no masses, nodularity or fullness.     COLPOSCOPY PROCEDURE NOTE    42 y/o  female presents for colposcopy.  Pap smear on 4/4/2019 was normal and HPV testing was +HRHPV (non 16/18.) Prior to this she has had previous colposcopies with benign results, one ASCUS pap.  Patient does not smoke    Prior cervical/vaginal disease: Normal exam without visible pathology.  Prior cervical treatment: no treatment.      Procedure for colposcopy and biopsy has been explained to the patient.  Consent:  Risks and benefits of treatment were discussed.  Patient's questions were elicited and answered and written consent signed and scanned into medical record.    PROCEDURE:  Speculum placed in vagina and " visualization of cervix acheived, cervix swabbed with acetic acid solution. No visible lesions were noted. Endocervical curettage was obtained. Random biopsies were taken. Cervix was then hemostatic.  FINDINGS:  Cervix: no visible lesions; .    ASSESSMENT: Normal exam without visible pathology.    PLAN:   -Specimens sent to pathology  -Will base further treatment on pathology findings.  -Post biopsy instructions given to patient and  -Will send Pathology results to patient via MyChart when they are available.        Assessment:    Long Maradiaga is a 43 year old woman with +HPVHR, normal pathology.     A total of 60 minutes was spent with the patient, 50 minutes of which were spent in counseling the patient and/or treatment planning.      Plan:       1.)    Colposcopy preformed today. Next steps pending results from ECC and biopsies. If negative/SKYELR I, plan repeat co-testing in one year. If SKYLER II-III or greater, plan excisional procedure (LEEP) in operating room due to difficulty visualizing surgery.      2.) Genetic risk factors were assessed and the patient does not meet the qualifications for a referral.      3.) Labs and/or tests ordered include:  Surgical pathology.     4.) No health maintenance issues addressed today.      Thank you for allowing us to participate in the care of your patient.         Sincerely,    I acted as a scribe for Dr. Burgos.  Porsha Garsia MD  PGY-3 Gyn Onc    Mirella Burgos MD  Gynecologic Oncology  [facility]      CC  Patient Care Team:  Hailey Chiang MD as PCP - General (Family Practice)    Rosie Lan MD as MD (OB/Gyn)

## 2019-08-27 NOTE — PATIENT INSTRUCTIONS
Cervical biopsies done, you will be contacted with results and recs for follow-up    Mirella Burgos MD  Gynecologic Oncology  [facility]

## 2019-08-27 NOTE — NURSING NOTE
"Oncology Rooming Note    August 27, 2019 12:48 PM   Long Maradiaga is a 43 year old female who presents for:    Chief Complaint   Patient presents with     Oncology Clinic Visit     New; Positive HPV      Initial Vitals: /87   Pulse 76   Temp 99.1  F (37.3  C) (Oral)   Resp 16   Ht 1.626 m (5' 4\")   Wt 89.8 kg (198 lb)   LMP 08/13/2019 (Exact Date)   SpO2 98%   Breastfeeding? No   BMI 33.99 kg/m   Estimated body mass index is 33.99 kg/m  as calculated from the following:    Height as of this encounter: 1.626 m (5' 4\").    Weight as of this encounter: 89.8 kg (198 lb). Body surface area is 2.01 meters squared.  No Pain (0) Comment: Data Unavailable   Patient's last menstrual period was 08/13/2019 (exact date).  Allergies reviewed: Yes  Medications reviewed: Yes    Medications: Medication refills not needed today.  Pharmacy name entered into [facility]: [id number] - [place], [place] - [address] AT [facility] [address]    Clinical concerns: HPV positive; COLPO       Cori Coello CMA              "

## 2019-08-27 NOTE — NURSING NOTE
"Procedure discussed. Consent signed. Time out completed. Both forms placed into scanning.    Prior to the start of the procedure and with procedural staff participation, I verbally confirmed the patient s identity using two indicators, relevant allergies, that the procedure was appropriate and matched the consent or emergent situation, and that the correct equipment/implants were available. Immediately prior to starting the procedure I conducted the Time Out with the procedural staff and re-confirmed the patient s name, procedure, and site/side. (The Joint Commission universal protocol was followed.)  Yes    Sedation (Moderate or Deep): None    Post procedure pain: \"cramping\"   Pain management discussed (tylenol/Ibuprofen/hot pack)    Aixa Felix RN    "

## 2019-08-30 LAB — COPATH REPORT: NORMAL

## 2019-09-11 ENCOUNTER — TELEPHONE (OUTPATIENT)
Dept: ONCOLOGY | Facility: CLINIC | Age: 44
End: 2019-09-11

## 2019-09-11 NOTE — TELEPHONE ENCOUNTER
Hi Dr. Burgos,  44 yo with a Wheatland done on 08/27/19 that was Benign for both the Bx and ECC. Pt hasn't been notified of this result yet. Please review, advise on follow up, and notify the pt.   Thanks,  Katrin Hanna, RN-Pap Tracking     Pap Hx:  4/2/14: ASCUS, +HPV 33/45.   4/2014 [place]: neg, cotest one year-in reminders  9/16/15: NIL pap, + HPV (not 16 or 18)   10/6/15: [place]: benign.  Pap/HPV in 1 year  03/21/18 Patient is lost to follow-up.   04/4/19: NIL pap, + HR HPV (not 16 or 18) result. Plan [place].   05/23/19: Wheatland cervix not able to be fully visualized. Plan Wheat[place] with Dr. Mirella Hill's cervical dysplasia clinic at the [facility].   08/27/19: Wheatland Bx and ECC Benign done at Gyn/Onc. Plan provider review.

## 2019-11-07 ENCOUNTER — HEALTH MAINTENANCE LETTER (OUTPATIENT)
Age: 44
End: 2019-11-07

## 2019-12-20 ENCOUNTER — OFFICE VISIT (OUTPATIENT)
Dept: DERMATOLOGY | Facility: CLINIC | Age: 44
End: 2019-12-20
Payer: COMMERCIAL

## 2019-12-20 VITALS — SYSTOLIC BLOOD PRESSURE: 122 MMHG | OXYGEN SATURATION: 97 % | DIASTOLIC BLOOD PRESSURE: 86 MMHG | HEART RATE: 92 BPM

## 2019-12-20 DIAGNOSIS — D22.9 NEVUS: ICD-10-CM

## 2019-12-20 DIAGNOSIS — L73.8 SEBACEOUS GLAND HYPERPLASIA: ICD-10-CM

## 2019-12-20 DIAGNOSIS — L81.4 LENTIGO: ICD-10-CM

## 2019-12-20 DIAGNOSIS — L72.11 PILAR CYST: ICD-10-CM

## 2019-12-20 DIAGNOSIS — L57.0 AK (ACTINIC KERATOSIS): ICD-10-CM

## 2019-12-20 DIAGNOSIS — L82.1 SEBORRHEIC KERATOSIS: ICD-10-CM

## 2019-12-20 DIAGNOSIS — D18.01 ANGIOMA OF SKIN: ICD-10-CM

## 2019-12-20 DIAGNOSIS — D23.9 DERMATOFIBROMA: ICD-10-CM

## 2019-12-20 DIAGNOSIS — D48.5 NEOPLASM OF UNCERTAIN BEHAVIOR OF SKIN: Primary | ICD-10-CM

## 2019-12-20 DIAGNOSIS — L91.8 INFLAMED ACROCHORDON: ICD-10-CM

## 2019-12-20 PROCEDURE — 11200 RMVL SKIN TAGS UP TO&INC 15: CPT | Performed by: PHYSICIAN ASSISTANT

## 2019-12-20 PROCEDURE — 11102 TANGNTL BX SKIN SINGLE LES: CPT | Performed by: PHYSICIAN ASSISTANT

## 2019-12-20 PROCEDURE — 88305 TISSUE EXAM BY PATHOLOGIST: CPT | Mod: TC | Performed by: PHYSICIAN ASSISTANT

## 2019-12-20 PROCEDURE — 99204 OFFICE O/P NEW MOD 45 MIN: CPT | Mod: 25 | Performed by: PHYSICIAN ASSISTANT

## 2019-12-20 PROCEDURE — 11103 TANGNTL BX SKIN EA SEP/ADDL: CPT | Mod: TC | Performed by: PHYSICIAN ASSISTANT

## 2019-12-20 NOTE — PATIENT INSTRUCTIONS
Wound Care Instructions     FOR SUPERFICIAL WOUNDS     [facility] [phone]                 AFTER 24 HOURS YOU SHOULD REMOVE THE BANDAGE AND BEGIN DAILY DRESSING CHANGES AS FOLLOWS:     1) Remove Dressing.     2) Clean and dry the area with tap water using a Q-tip or sterile gauze pad.     3) Apply Vaseline, Aquaphor, Polysporin ointment or Bacitracin ointment over entire wound.  Do NOT use Neosporin ointment.     4) Cover the wound with a band-aid, or a sterile non-stick gauze pad and micropore paper tape    REPEAT THESE INSTRUCTIONS AT LEAST ONCE A DAY UNTIL THE WOUND HAS COMPLETELY HEALED.    It is an old wives tale that a wound heals better when it is exposed to air and allowed to dry out. The wound will heal faster with a better cosmetic result if it is kept moist with ointment and covered with a bandage.    **Do not let the wound dry out.**    Supplies Needed:      *Cotton tipped applicators (Q-tips)    *Vaseline, Aquaphor, Polysporin or Bacitracin Ointment (NOT NEOSPORIN)    *Band-aids or non-stick gauze pads and micropore paper tape.      PATIENT INFORMATION:    During the healing process you will notice a number of changes. All wounds develop a small halo of redness surrounding the wound.  This means healing is occurring. Severe itching with extensive redness usually indicates sensitivity to the ointment or bandage tape used to dress the wound.  You should call our office if this develops.      Swelling  and/or discoloration around your surgical site is common, particularly when performed around the eye.    All wounds normally drain.  The larger the wound the more drainage there will be.  After 7-10 days, you will notice the wound beginning to shrink and new skin will begin to grow.  The wound is healed when you can see skin has formed over the entire area.  A healed wound has a healthy, shiny look to the surface and is red to dark pink in color to normalize.  Wounds may take approximately  4-6 weeks to heal.  Larger wounds may take 6-8 weeks.  After the wound is healed you may discontinue dressing changes.    You may experience a sensation of tightness as your wound heals. This is normal and will gradually subside.    Your healed wound may be sensitive to temperature changes. This sensitivity improves with time, but if you re having a lot of discomfort, try to avoid temperature extremes.    Patients frequently experience itching after their wound appears to have healed because of the continue healing under the skin.  Plain Vaseline will help relieve the itching.      POSSIBLE COMPLICATIONS    BLEEDIN. Leave the bandage in place.  2. Use tightly rolled up gauze or a cloth to apply direct pressure over the bandage for 30  minutes.  3. Reapply pressure for an additional 30 minutes if necessary  4. Use additional gauze and tape to maintain pressure once the bleeding has stopped.

## 2019-12-20 NOTE — LETTER
12/20/2019         RE: Long Maradiaga  [address] [place] [address]        Dear Colleague,    Thank you for referring your patient, Long Maradiaga, to the [facility]. Please see a copy of my visit note below.    HPI:   Chief complaints: Long Maradiaga is a 44 year old female who presents for Full skin cancer screening to rule out skin cancer   Last Skin Exam: >7 years      1st Baseline: YES  Personal HX of Skin Cancer: none   Personal HX of Malignant Melanoma: none   Family HX of Skin Cancer / Malignant Melanoma: M grandmother, unsure which type  Personal HX of Atypical Moles:  none  Risk factors: sun exposure in the past; fair skin  New / Changing lesions: yes, bump on the scalp  Social History: lives in [place] in [place]. Works from home as a . She is from [place] originally  On review of systems, there are no further skin complaints, patient is feeling otherwise well.  See patient intake sheet.  ROS of the following were done and are negative: Constitutional, Eyes, Ears, Nose,   Mouth, Throat, Cardiovascular, Respiratory, GI, Genitourinary, Musculoskeletal,   Psychiatric, Endocrine, Allergic/Immunologic.    This document serves as a record of the services and decisions personally performed and made by Sheila Jalloh, MS, PA-C. It was created on her behalf by Leandra Munoz, a trained medical scribe. The creation of this document is based on the provider's statements to the medical scribe.  Leandra Munoz 9:43 AM December 20, 2019    PHYSICAL EXAM:   /86   Pulse 92   LMP 11/29/2019   SpO2 97%   Breastfeeding No   Skin exam performed as follows: Type 2 skin. Mood appropriate  Alert and Oriented X 3. Well developed, well nourished in no distress.  General appearance: Normal  Head including face: Normal  Eyes: conjunctiva and lids: Normal  Mouth: Lips, teeth, gums: Normal  Neck: Normal  Chest-breast/axillae: Normal  Back: Normal  Spleen  and liver: Normal  Cardiovascular: Exam of peripheral vascular system by observation for swelling, varicosities, edema: Normal  Genitalia: groin, buttocks: Normal  Extremities: digits/nails (clubbing): Normal  Eccrine and Apocrine glands: Normal  Right upper extremity: Normal  Left upper extremity: Normal  Right lower extremity: Normal  Left lower extremity: Normal  Skin: Scalp and body hair: See below    Pt deferred exam of breasts, groin, buttocks: No    Other physical findings:  1. Multiple pigmented macules on extremities and trunk  2. Multiple pigmented macules on face, trunk and extremities  3. Multiple vascular papules on trunk, arms and legs  4. Multiple scattered keratotic plaques  5. 10 mm smooth subcutaneous nodule on vertex scalp   6. Pink gritty papule on the left cheek x 1  7. Firm papule with dimple sign on the right upper leg   8. Inflamed pedunculated papule on left axilla x 2, left inframammary chest x 3  9. Yellow waxy papules on forehead   10. 12 mm pink papule left lower abdomen    11. 3 mm brown papule left inframammary chest  12. 4 mm pink brown papule on right trapezius  13. 5 mm pink papule on left wrist     Except as noted above, no other signs of skin cancer or melanoma.     ASSESSMENT/PLAN:   Benign Full skin cancer screening today.    Patient with history of none  Advised on monthly self exams and 1 year  Patient Education: Appropriate brochures given.    1. Multiple benign appearing nevi on arms, legs and trunk. Discussed ABCDEs of melanoma and sunscreen.   2. Multiple lentigos on arms, legs and trunk. Advised benign, no treatment needed.  3. Multiple scattered angiomas. Advised benign, no treatment needed.   4. Seborrheic keratosis on arms, legs and trunk. Advised benign, no treatment needed.  5. Pilar cyst- advised benign and no treatment needed. Discussed excision with Dr. Adame if bothersome.   6. Actinic keratosis on the left cheek x 2. She will reschedule for LN2 after the  holidays  7. Dermatofibroma on right upper leg. Advised benign no treatment needed.   8. Inflamed acrochordon on the left axilla x 2, left inframammary chest x 3. Irritated per patient. Snip excision performed on all areas. Bleeding stopped. Pt tolerated well with no complications.   9. Sebaceous gland hyperplasia located on forehead. Advised benign.   10. Dermal nevus r/o atypicality on left lower abdomen, left intermammary chest, and left wrist. Shave biopsy performed.  Area cleaned and anesthetized with 1% lidocaine with epinephrine.  Dermablade used to remove the lesion and sent to pathology. Bleeding was cauterized. Pt tolerated procedure well with no complications.    11.  Atypical nevus on right trapezius. Photo taken and placed in chart. Shave biopsy performed.  Area cleaned and anesthetized with 1% lidocaine with epinephrine.  Dermablade used to remove the lesion and sent to pathology. Bleeding was cauterized. Pt tolerated procedure well with no complications.     12. Long to follow up with Primary Care provider regarding elevated blood pressure.        Follow-up: yearly FSE/PRN sooner    1.) Patient was asked about new and changing moles. YES  2.) Patient received a complete physical skin examination: YES  3.) Patient was counseled to perform a monthly self skin examination: YES  Scribed By: Leandra Munoz, Medical Scribe    The information in this document, created by the medical scribe for me, accurately reflects the services I personally performed and the decisions made by me. I have reviewed and approved this document for accuracy prior to leaving the patient care area.  December 20, 2019 9:55 AM    Sheila Jalloh MS, PADIXON        Again, thank you for allowing me to participate in the care of your patient.        Sincerely,        Sheila Jalloh PA-C

## 2019-12-20 NOTE — PROGRESS NOTES
HPI:   Chief complaints: Long Maradiaga is a 44 year old female who presents for Full skin cancer screening to rule out skin cancer   Last Skin Exam: >7 years      1st Baseline: YES  Personal HX of Skin Cancer: none   Personal HX of Malignant Melanoma: none   Family HX of Skin Cancer / Malignant Melanoma: M grandmother, unsure which type  Personal HX of Atypical Moles:  none  Risk factors: sun exposure in the past; fair skin  New / Changing lesions: yes, bump on the scalp  Social History: lives in [place] in [place]. Works from home as a . She is from [place] originally  On review of systems, there are no further skin complaints, patient is feeling otherwise well.  See patient intake sheet.  ROS of the following were done and are negative: Constitutional, Eyes, Ears, Nose,   Mouth, Throat, Cardiovascular, Respiratory, GI, Genitourinary, Musculoskeletal,   Psychiatric, Endocrine, Allergic/Immunologic.    This document serves as a record of the services and decisions personally performed and made by Sheila Jalloh, MS, PA-C. It was created on her behalf by Leandra Munoz, a trained medical scribe. The creation of this document is based on the provider's statements to the medical scribe.  Leandra Munoz 9:43 AM December 20, 2019    PHYSICAL EXAM:   /86   Pulse 92   LMP 11/29/2019   SpO2 97%   Breastfeeding No   Skin exam performed as follows: Type 2 skin. Mood appropriate  Alert and Oriented X 3. Well developed, well nourished in no distress.  General appearance: Normal  Head including face: Normal  Eyes: conjunctiva and lids: Normal  Mouth: Lips, teeth, gums: Normal  Neck: Normal  Chest-breast/axillae: Normal  Back: Normal  Spleen and liver: Normal  Cardiovascular: Exam of peripheral vascular system by observation for swelling, varicosities, edema: Normal  Genitalia: groin, buttocks: Normal  Extremities: digits/nails (clubbing): Normal  Eccrine and Apocrine glands:  Normal  Right upper extremity: Normal  Left upper extremity: Normal  Right lower extremity: Normal  Left lower extremity: Normal  Skin: Scalp and body hair: See below    Pt deferred exam of breasts, groin, buttocks: No    Other physical findings:  1. Multiple pigmented macules on extremities and trunk  2. Multiple pigmented macules on face, trunk and extremities  3. Multiple vascular papules on trunk, arms and legs  4. Multiple scattered keratotic plaques  5. 10 mm smooth subcutaneous nodule on vertex scalp   6. Pink gritty papule on the left cheek x 1  7. Firm papule with dimple sign on the right upper leg   8. Inflamed pedunculated papule on left axilla x 2, left inframammary chest x 3  9. Yellow waxy papules on forehead   10. 12 mm pink papule left lower abdomen    11. 3 mm brown papule left inframammary chest  12. 4 mm pink brown papule on right trapezius  13. 5 mm pink papule on left wrist     Except as noted above, no other signs of skin cancer or melanoma.     ASSESSMENT/PLAN:   Benign Full skin cancer screening today.    Patient with history of none  Advised on monthly self exams and 1 year  Patient Education: Appropriate brochures given.    1. Multiple benign appearing nevi on arms, legs and trunk. Discussed ABCDEs of melanoma and sunscreen.   2. Multiple lentigos on arms, legs and trunk. Advised benign, no treatment needed.  3. Multiple scattered angiomas. Advised benign, no treatment needed.   4. Seborrheic keratosis on arms, legs and trunk. Advised benign, no treatment needed.  5. Pilar cyst- advised benign and no treatment needed. Discussed excision with Dr. Adame if bothersome.   6. Actinic keratosis on the left cheek x 2. She will reschedule for LN2 after the holidays  7. Dermatofibroma on right upper leg. Advised benign no treatment needed.   8. Inflamed acrochordon on the left axilla x 2, left inframammary chest x 3. Irritated per patient. Snip excision performed on all areas. Bleeding stopped. Pt  tolerated well with no complications.   9. Sebaceous gland hyperplasia located on forehead. Advised benign.   10. Dermal nevus r/o atypicality on left lower abdomen, left intermammary chest, and left wrist. Shave biopsy performed.  Area cleaned and anesthetized with 1% lidocaine with epinephrine.  Dermablade used to remove the lesion and sent to pathology. Bleeding was cauterized. Pt tolerated procedure well with no complications.    11.  Atypical nevus on right trapezius. Photo taken and placed in chart. Shave biopsy performed.  Area cleaned and anesthetized with 1% lidocaine with epinephrine.  Dermablade used to remove the lesion and sent to pathology. Bleeding was cauterized. Pt tolerated procedure well with no complications.     12. Long to follow up with Primary Care provider regarding elevated blood pressure.        Follow-up: yearly FSE/PRN sooner    1.) Patient was asked about new and changing moles. YES  2.) Patient received a complete physical skin examination: YES  3.) Patient was counseled to perform a monthly self skin examination: YES  Scribed By: Leandra Munoz, Medical Scribe    The information in this document, created by the medical scribe for me, accurately reflects the services I personally performed and the decisions made by me. I have reviewed and approved this document for accuracy prior to leaving the patient care area.  December 20, 2019 9:55 AM    Sheila Jalloh MS, PASravanC

## 2019-12-27 LAB — COPATH REPORT: NORMAL

## 2020-11-29 ENCOUNTER — HEALTH MAINTENANCE LETTER (OUTPATIENT)
Age: 45
End: 2020-11-29

## 2020-12-22 ENCOUNTER — PATIENT OUTREACH (OUTPATIENT)
Dept: OBGYN | Facility: CLINIC | Age: 45
End: 2020-12-22

## 2020-12-22 DIAGNOSIS — R87.610 ASCUS OF CERVIX WITH NEGATIVE HIGH RISK HPV: ICD-10-CM

## 2020-12-22 NOTE — TELEPHONE ENCOUNTER
4/2/14: ASCUS, +HPV 33/45.   4/2014 [place]: neg, cotest one year-in reminders  9/16/15: NIL pap, + HPV (not 16 or 18)   10/6/15: [place]: benign.  Pap/HPV in 1 year  03/21/18 Patient is lost to follow-up.   04/4/19: NIL pap, + HR HPV (not 16 or 18) result. Plan [place].   05/23/19: [place] cervix not able to be fully visualized. Plan [place] with Dr. Mirella Hill's cervical dysplasia clinic at the [facility].   08/27/19: [place] Bx and ECC Benign done at Gyn/Onc. Plan cotest in 1 year.   11/23/20 Reminder [place]  12/22/20 Reminder call - LM

## 2021-01-21 NOTE — TELEPHONE ENCOUNTER
Hi Dr. Lan,  Patient is lost to pap tracking follow-up. Attempts to contact pt have been made per reminder process and there has been no reply and/or no appt scheduled.    Pap Hx:  4/2/14: ASCUS, +HPV 33/45.   4/2014 [place]: neg, cotest one year-in reminders  9/16/15: NIL pap, + HPV (not 16 or 18)   10/6/15: [place]: benign.  Pap/HPV in 1 year  03/21/18 Patient is lost to follow-up.   04/4/19: NIL pap, + HR HPV (not 16 or 18) result. Plan [place].   05/23/19: [place] cervix not able to be fully visualized. Plan [place] with Dr. Mirella Hill's cervical dysplasia clinic at the [facility].   08/27/19: [place] Bx and ECC Benign done at Gyn/Onc. Plan cotest in 1 year.   11/23/20 Reminder MyChart  12/22/20 Reminder call - LM  01/21/21  Lost to follow-up for pap tracking, fyi routed to provider.

## 2021-02-14 ENCOUNTER — HEALTH MAINTENANCE LETTER (OUTPATIENT)
Age: 46
End: 2021-02-14

## 2021-09-25 ENCOUNTER — HEALTH MAINTENANCE LETTER (OUTPATIENT)
Age: 46
End: 2021-09-25

## 2022-01-15 ENCOUNTER — HEALTH MAINTENANCE LETTER (OUTPATIENT)
Age: 47
End: 2022-01-15

## 2022-01-28 ENCOUNTER — ANCILLARY PROCEDURE (OUTPATIENT)
Dept: MAMMOGRAPHY | Facility: CLINIC | Age: 47
End: 2022-01-28
Attending: FAMILY MEDICINE
Payer: COMMERCIAL

## 2022-01-28 DIAGNOSIS — Z12.31 VISIT FOR SCREENING MAMMOGRAM: ICD-10-CM

## 2022-01-28 PROCEDURE — 77063 BREAST TOMOSYNTHESIS BI: CPT | Mod: TC | Performed by: RADIOLOGY

## 2022-01-28 PROCEDURE — 77067 SCR MAMMO BI INCL CAD: CPT | Mod: TC | Performed by: RADIOLOGY

## 2022-04-08 ENCOUNTER — OFFICE VISIT (OUTPATIENT)
Dept: DERMATOLOGY | Facility: CLINIC | Age: 47
End: 2022-04-08
Payer: COMMERCIAL

## 2022-04-08 VITALS — HEART RATE: 67 BPM | DIASTOLIC BLOOD PRESSURE: 74 MMHG | SYSTOLIC BLOOD PRESSURE: 133 MMHG | OXYGEN SATURATION: 98 %

## 2022-04-08 DIAGNOSIS — D22.9 NEVUS: Primary | ICD-10-CM

## 2022-04-08 DIAGNOSIS — L57.0 ACTINIC KERATOSIS: ICD-10-CM

## 2022-04-08 DIAGNOSIS — L82.1 SEBORRHEIC KERATOSIS: ICD-10-CM

## 2022-04-08 DIAGNOSIS — D18.01 ANGIOMA OF SKIN: ICD-10-CM

## 2022-04-08 DIAGNOSIS — L81.4 LENTIGO: ICD-10-CM

## 2022-04-08 DIAGNOSIS — L72.11 PILAR CYST: ICD-10-CM

## 2022-04-08 PROCEDURE — 99213 OFFICE O/P EST LOW 20 MIN: CPT | Mod: 25 | Performed by: PHYSICIAN ASSISTANT

## 2022-04-08 PROCEDURE — 17000 DESTRUCT PREMALG LESION: CPT | Performed by: PHYSICIAN ASSISTANT

## 2022-04-08 NOTE — LETTER
4/8/2022         RE: Long Maradiaga  [address] [place] [address]        Dear Colleague,    Thank you for referring your patient, Long Maradiaga, to the [facility]. Please see a copy of my visit note below.    HPI:   Chief complaints: Long Maradiaga is a 44 year old female who presents for Full skin cancer screening to rule out skin cancer.   Last Skin Exam: 3 years      1st Baseline: YES  Personal HX of Skin Cancer: none   Personal HX of Malignant Melanoma: none   Family HX of Skin Cancer / Malignant Melanoma: M grandmother, unsure which type  Personal HX of Atypical Moles:  none  Risk factors: sun exposure in the past; fair skin  New / Changing lesions: yes, bump on the scalp  Social History: lives in [place] in [place]. Works from home as a . She is from [place] originally  Additional concern: She notes the pilar cyst on her vertex scalp has been growing and becoming bothersome  On review of systems, there are no further skin complaints, patient is feeling otherwise well.  See patient intake sheet.  ROS of the following were done and are negative: Constitutional, Eyes, Ears, Nose,   Mouth, Throat, Cardiovascular, Respiratory, GI, Genitourinary, Musculoskeletal,   Psychiatric, Endocrine, Allergic/Immunologic.      PHYSICAL EXAM:   /74   Pulse 67   SpO2 98%   Skin exam performed as follows: Type 2 skin. Mood appropriate  Alert and Oriented X 3. Well developed, well nourished in no distress.  General appearance: Normal  Head including face: Normal  Eyes: conjunctiva and lids: Normal  Mouth: Lips, teeth, gums: Normal  Neck: Normal  Chest-breast/axillae: Normal  Back: Normal  Spleen and liver: Normal  Cardiovascular: Exam of peripheral vascular system by observation for swelling, varicosities, edema: Normal  Genitalia: groin, buttocks: Normal  Extremities: digits/nails (clubbing): Normal  Eccrine and Apocrine glands: Normal  Right upper extremity:  Normal  Left upper extremity: Normal  Right lower extremity: Normal  Left lower extremity: Normal  Skin: Scalp and body hair: See below    Pt deferred exam of breasts, groin, buttocks: No    Other physical findings:  1. Multiple pigmented macules on extremities and trunk  2. Multiple pigmented macules on face, trunk and extremities  3. Multiple vascular papules on trunk, arms and legs  4. Multiple scattered keratotic plaques  5. 25 mm smooth subcutaneous nodule on vertex scalp, 6 mm smooth subcutanous nodule on parietal scalp, 2 mm smooth subcutaneous nodule on occipital scalp  6. Pink gritty papule on the left cheek x 1       Except as noted above, no other signs of skin cancer or melanoma.     ASSESSMENT/PLAN:   Benign Full skin cancer screening today. Patient with history of none  Advised on monthly self exams and 1 year  Patient Education: Appropriate brochures given.    1. Multiple benign appearing nevi on arms, legs and trunk. Discussed ABCDEs of melanoma and sunscreen.   2. Multiple lentigos on arms, legs and trunk. Advised benign, no treatment needed.  3. Multiple scattered angiomas. Advised benign, no treatment needed.   4. Seborrheic keratosis on arms, legs and trunk. Advised benign, no treatment needed.  5. Pilar cyst- advised benign and no treatment needed. Cyst on scalp is becoming bothersome to patient as she notices it poking through her hair. Discussed excision with Dr. Adame or Ce Escobar.  6. Actinic keratosis on the left cheek x 1. As precancerous, cryosurgery performed. Advised on blistering and post-op care. Advised if not resolved in 1-2 months to return for evaluation  7. Dermatofibroma on right upper leg. Advised benign no treatment needed.           Follow-up: yearly FSE/PRN sooner    1.) Patient was asked about new and changing moles. YES  2.) Patient received a complete physical skin examination: YES  3.) Patient was counseled to perform a monthly self skin examination: YES  Scribed  By: Sheila Jalloh, MS, PASravanC          Again, thank you for allowing me to participate in the care of your patient.        Sincerely,        Sheila Jalloh PA-C

## 2022-04-08 NOTE — PROGRESS NOTES
HPI:   Chief complaints: Long Maradiaga is a 44 year old female who presents for Full skin cancer screening to rule out skin cancer.   Last Skin Exam: 3 years      1st Baseline: YES  Personal HX of Skin Cancer: none   Personal HX of Malignant Melanoma: none   Family HX of Skin Cancer / Malignant Melanoma: M grandmother, unsure which type  Personal HX of Atypical Moles:  none  Risk factors: sun exposure in the past; fair skin  New / Changing lesions: yes, bump on the scalp  Social History: lives in [place] in [place]. Works from home as a . She is from [place] originally  Additional concern: She notes the pilar cyst on her vertex scalp has been growing and becoming bothersome  On review of systems, there are no further skin complaints, patient is feeling otherwise well.  See patient intake sheet.  ROS of the following were done and are negative: Constitutional, Eyes, Ears, Nose,   Mouth, Throat, Cardiovascular, Respiratory, GI, Genitourinary, Musculoskeletal,   Psychiatric, Endocrine, Allergic/Immunologic.      PHYSICAL EXAM:   /74   Pulse 67   SpO2 98%   Skin exam performed as follows: Type 2 skin. Mood appropriate  Alert and Oriented X 3. Well developed, well nourished in no distress.  General appearance: Normal  Head including face: Normal  Eyes: conjunctiva and lids: Normal  Mouth: Lips, teeth, gums: Normal  Neck: Normal  Chest-breast/axillae: Normal  Back: Normal  Spleen and liver: Normal  Cardiovascular: Exam of peripheral vascular system by observation for swelling, varicosities, edema: Normal  Genitalia: groin, buttocks: Normal  Extremities: digits/nails (clubbing): Normal  Eccrine and Apocrine glands: Normal  Right upper extremity: Normal  Left upper extremity: Normal  Right lower extremity: Normal  Left lower extremity: Normal  Skin: Scalp and body hair: See below    Pt deferred exam of breasts, groin, buttocks: No    Other physical findings:  1. Multiple pigmented macules on  extremities and trunk  2. Multiple pigmented macules on face, trunk and extremities  3. Multiple vascular papules on trunk, arms and legs  4. Multiple scattered keratotic plaques  5. 25 mm smooth subcutaneous nodule on vertex scalp, 6 mm smooth subcutanous nodule on parietal scalp, 2 mm smooth subcutaneous nodule on occipital scalp  6. Pink gritty papule on the left cheek x 1       Except as noted above, no other signs of skin cancer or melanoma.     ASSESSMENT/PLAN:   Benign Full skin cancer screening today. Patient with history of none  Advised on monthly self exams and 1 year  Patient Education: Appropriate brochures given.    1. Multiple benign appearing nevi on arms, legs and trunk. Discussed ABCDEs of melanoma and sunscreen.   2. Multiple lentigos on arms, legs and trunk. Advised benign, no treatment needed.  3. Multiple scattered angiomas. Advised benign, no treatment needed.   4. Seborrheic keratosis on arms, legs and trunk. Advised benign, no treatment needed.  5. Pilar cyst- advised benign and no treatment needed. Cyst on scalp is becoming bothersome to patient as she notices it poking through her hair. Discussed excision with Dr. Adame or Ce Escobar.  6. Actinic keratosis on the left cheek x 1. As precancerous, cryosurgery performed. Advised on blistering and post-op care. Advised if not resolved in 1-2 months to return for evaluation  7. Dermatofibroma on right upper leg. Advised benign no treatment needed.           Follow-up: yearly FSE/PRN sooner    1.) Patient was asked about new and changing moles. YES  2.) Patient received a complete physical skin examination: YES  3.) Patient was counseled to perform a monthly self skin examination: YES  Scribed By: Sheila Jalloh, MS, PASravanC

## 2022-04-17 ASSESSMENT — ENCOUNTER SYMPTOMS
DIZZINESS: 0
HEMATURIA: 0
FEVER: 0
HEARTBURN: 0
SHORTNESS OF BREATH: 0
DYSURIA: 0
HEMATOCHEZIA: 0
EYE PAIN: 0
SORE THROAT: 0
PARESTHESIAS: 0
ARTHRALGIAS: 0
NERVOUS/ANXIOUS: 0
BREAST MASS: 0
WEAKNESS: 0
HEADACHES: 0
FREQUENCY: 0
ABDOMINAL PAIN: 0
JOINT SWELLING: 0
CONSTIPATION: 0
CHILLS: 0
DIARRHEA: 0
NAUSEA: 0
COUGH: 0
PALPITATIONS: 0
MYALGIAS: 0

## 2022-04-18 ENCOUNTER — OFFICE VISIT (OUTPATIENT)
Dept: FAMILY MEDICINE | Facility: CLINIC | Age: 47
End: 2022-04-18
Payer: COMMERCIAL

## 2022-04-18 VITALS
BODY MASS INDEX: 31.76 KG/M2 | TEMPERATURE: 97.4 F | DIASTOLIC BLOOD PRESSURE: 82 MMHG | HEIGHT: 64 IN | SYSTOLIC BLOOD PRESSURE: 123 MMHG | WEIGHT: 186 LBS | HEART RATE: 61 BPM

## 2022-04-18 DIAGNOSIS — Z13.220 SCREENING FOR HYPERLIPIDEMIA: ICD-10-CM

## 2022-04-18 DIAGNOSIS — Z00.00 ROUTINE GENERAL MEDICAL EXAMINATION AT A HEALTH CARE FACILITY: Primary | ICD-10-CM

## 2022-04-18 DIAGNOSIS — Z11.59 NEED FOR HEPATITIS C SCREENING TEST: ICD-10-CM

## 2022-04-18 DIAGNOSIS — Z11.4 SCREENING FOR HIV (HUMAN IMMUNODEFICIENCY VIRUS): ICD-10-CM

## 2022-04-18 DIAGNOSIS — Z13.1 SCREENING FOR DIABETES MELLITUS: ICD-10-CM

## 2022-04-18 DIAGNOSIS — Z12.11 SCREEN FOR COLON CANCER: ICD-10-CM

## 2022-04-18 DIAGNOSIS — B97.7 HIGH RISK HPV INFECTION: ICD-10-CM

## 2022-04-18 PROCEDURE — 99396 PREV VISIT EST AGE 40-64: CPT | Performed by: FAMILY MEDICINE

## 2022-04-18 ASSESSMENT — ENCOUNTER SYMPTOMS
ARTHRALGIAS: 0
FREQUENCY: 0
CONSTIPATION: 0
NAUSEA: 0
PALPITATIONS: 0
EYE PAIN: 0
MYALGIAS: 0
FEVER: 0
HEMATOCHEZIA: 0
PARESTHESIAS: 0
WEAKNESS: 0
NERVOUS/ANXIOUS: 0
COUGH: 0
BREAST MASS: 0
HEARTBURN: 0
JOINT SWELLING: 0
SORE THROAT: 0
DIZZINESS: 0
DYSURIA: 0
CHILLS: 0
ABDOMINAL PAIN: 0
HEADACHES: 0
DIARRHEA: 0
SHORTNESS OF BREATH: 0
HEMATURIA: 0

## 2022-04-18 NOTE — PROGRESS NOTES
SUBJECTIVE:   CC: Long Maradiaga is an 46 year old woman who presents for preventive health visit.       Patient has been advised of split billing requirements and indicates understanding: Yes  Healthy Habits:     Getting at least 3 servings of Calcium per day:  Yes    Bi-annual eye exam:  Yes    Dental care twice a year:  Yes    Sleep apnea or symptoms of sleep apnea:  None    Diet:  Regular (no restrictions)    Frequency of exercise:  4-5 days/week    Duration of exercise:  15-30 minutes    Taking medications regularly:  Yes    Medication side effects:  Not applicable    PHQ-2 Total Score: 0    Additional concerns today:  Yes      Today's PHQ-2 Score:   PHQ-2 ( 1999 Pfizer) 4/17/2022   Q1: Little interest or pleasure in doing things 0   Q2: Feeling down, depressed or hopeless 0   PHQ-2 Score 0   Q1: Little interest or pleasure in doing things Not at all   Q2: Feeling down, depressed or hopeless Not at all   PHQ-2 Score 0       Abuse: Current or Past (Physical, Sexual or Emotional) - No  Do you feel safe in your environment? Yes         Social History     Tobacco Use     Smoking status: Never Smoker     Smokeless tobacco: Never Used   Substance Use Topics     Alcohol use: Not Currently     Comment: haven't had a drink since Sept 2017     If you drink alcohol do you typically have >3 drinks per day or >7 drinks per week? Not applicable    Alcohol Use 4/18/2022   Prescreen: >3 drinks/day or >7 drinks/week? -   Prescreen: >3 drinks/day or >7 drinks/week? Not Applicable       Reviewed orders with patient.  Reviewed health maintenance and updated orders accordingly - Yes       Breast Cancer Screening:    FHS-7:   Breast CA Risk Assessment (FHS-7) 1/28/2022 4/17/2022   Did any of your first-degree relatives have breast or ovarian cancer? No No   Did any of your relatives have bilateral breast cancer? No No   Did any man in your family have breast cancer? No Yes   Did any woman in your family have breast and ovarian  cancer? No No   Did any woman in your family have breast cancer before age 50 y? No No   Do you have 2 or more relatives with breast and/or ovarian cancer? No No   Do you have 2 or more relatives with breast and/or bowel cancer? No No        Pertinent mammograms are reviewed under the imaging tab.    History of abnormal Pap smear:    PAP / HPV Latest Ref Rng & Units 2019   PAP (Historical) - NIL NIL ASC-US(A)   HPV16 NEG:Negative Negative Negative -   HPV18 NEG:Negative Negative Negative -   HRHPV NEG:Negative Positive(A) Positive(A) -     Reviewed and updated as needed this visit by clinical staff   Tobacco  Allergies  Meds   Med Hx  Surg Hx  Fam Hx  Soc Hx          Reviewed and updated as needed this visit by Provider                   Past Medical History:   Diagnosis Date     ASCUS with positive high risk HPV 2014    +HPV33/45, colp neg     Cervical high risk HPV (human papillomavirus) test positive 2015, 19    NIL pap, + HPV (not 16 or 18)     Elevated blood pressure     borderline HBP. always higher in office!      Past Surgical History:   Procedure Laterality Date     GENITOURINARY SURGERY       - bladder     KIDNEY SURGERY      as a baby, corrective kidney and bladder surgery     KNEE SURGERY      x 2  and  for ACL and MCL     ORTHOPEDIC SURGERY       - R-ACL/MCL,  - L-ACL     OB History    Para Term  AB Living   0 0 0 0 0 0   SAB IAB Ectopic Multiple Live Births   0 0 0 0 0       Review of Systems   Constitutional: Negative for chills and fever.   HENT: Negative for congestion, ear pain, hearing loss and sore throat.    Eyes: Negative for pain and visual disturbance.   Respiratory: Negative for cough and shortness of breath.    Cardiovascular: Negative for chest pain, palpitations and peripheral edema.   Gastrointestinal: Negative for abdominal pain, constipation, diarrhea, heartburn, hematochezia and nausea.   Breasts:  Negative  "for tenderness, breast mass and discharge.   Genitourinary: Negative for dysuria, frequency, genital sores, hematuria, pelvic pain, urgency, vaginal bleeding and vaginal discharge.   Musculoskeletal: Negative for arthralgias, joint swelling and myalgias.   Skin: Negative for rash.   Neurological: Negative for dizziness, weakness, headaches and paresthesias.   Psychiatric/Behavioral: Negative for mood changes. The patient is not nervous/anxious.            OBJECTIVE:   /82 (BP Location: Right arm, Patient Position: Chair, Cuff Size: Adult Regular)   Pulse 61   Temp 97.4  F (36.3  C) (Temporal)   Ht 1.626 m (5' 4\")   Wt 84.4 kg (186 lb)   LMP 04/18/2022 (Exact Date)   BMI 31.93 kg/m     Wt Readings from Last 5 Encounters:   04/18/22 84.4 kg (186 lb)   08/27/19 89.8 kg (198 lb)   05/23/19 89.4 kg (197 lb)   04/04/19 89.8 kg (198 lb)   03/09/17 103.4 kg (228 lb)      Physical Exam  GENERAL: healthy, alert and no distress  EYES: Eyes grossly normal to inspection, PERRL and conjunctivae and sclerae normal  HENT: ear canals and TM's normal, nose and mouth without ulcers or lesions  NECK: no adenopathy, no asymmetry, masses, or scars and thyroid normal to palpation  RESP: lungs clear to auscultation - no rales, rhonchi or wheezes  CV: regular rate and rhythm, normal S1 S2, no S3 or S4, no murmur, click or rub, no peripheral edema and peripheral pulses strong  ABDOMEN: soft, nontender, no hepatosplenomegaly, no masses and bowel sounds normal  MS: no gross musculoskeletal defects noted, no edema  SKIN: no suspicious lesions or rashes  NEURO: Normal strength and tone, mentation intact and speech normal  PSYCH: mentation appears normal, affect normal/bright    Diagnostic Test Results:  Labs reviewed in Epic    ASSESSMENT/PLAN:       ICD-10-CM    1. Routine general medical examination at a health care facility  Z00.00    2. Screen for colon cancer  Z12.11    3. Screening for HIV (human immunodeficiency virus)  Z11.4  " "  4. Need for hepatitis C screening test  Z11.59    5. Cervical cancer screening  Z12.4    6. Screening for hyperlipidemia  Z13.220    7. High risk HPV infection  B97.7    8. Screening for diabetes mellitus  Z13.1      Routine general medical examination at a health care facility  -- MAMMO completed 1/28/2022  -- h/o high risk HPV - PAP scheduled in May with gynecology  Will complete screening lipids and glucose when she is able to return fasting.  Recommended screening hepatitis C and HIV for low risk adult  She has received both doses of the Pfizer COVID-19 vaccine and a booster dose  I recommended scheduling colonoscopy for colon cancer screening.       Obesity, unspecified obesity severity, unspecified obesity type  She has been successfully losing weight. I congratulated her on her efforts.     Checking glucose at next lab draw. She has started a program with a  at her gym and is motivated to lose weight through diet and exercise. She would like to check back with me at some interval to keep her on track with her weight loss efforts. Discussed making an apt in three months.                COUNSELING:  Reviewed preventive health counseling, as reflected in patient instructions    Estimated body mass index is 31.93 kg/m  as calculated from the following:    Height as of this encounter: 1.626 m (5' 4\").    Weight as of this encounter: 84.4 kg (186 lb).    Weight management plan: dietary changes    She reports that she has never smoked. She has never used smokeless tobacco.      Counseling Resources:  ATP IV Guidelines  Pooled Cohorts Equation Calculator  Breast Cancer Risk Calculator  BRCA-Related Cancer Risk Assessment: FHS-7 Tool  FRAX Risk Assessment  ICSI Preventive Guidelines  Dietary Guidelines for Americans, 2010  USDA's MyPlate  ASA Prophylaxis  Lung CA Screening    Hailey Chiang MD   [facility]  "

## 2022-04-18 NOTE — PATIENT INSTRUCTIONS
Preventive Health Recommendations  Female Ages 40 to 49    Yearly exam:   See your health care provider every year in order to  Review health changes.   Discuss preventive care.    Review your medicines if your doctor prescribed any.    Get a Pap test every three years (unless you have an abnormal result and your provider advises testing more often).    If you get Pap tests with HPV test, you only need to test every 5 years, unless you have an abnormal result. You do not need a Pap test if your uterus was removed (hysterectomy) and you have not had cancer.    You should be tested each year for STDs (sexually transmitted diseases), if you're at risk.   Ask your doctor if you should have a mammogram.    Have a colonoscopy (test for colon cancer) if someone in your family has had colon cancer or polyps before age 50.     Have a cholesterol test every 5 years.     Have a diabetes test (fasting glucose) after age 45. If you are at risk for diabetes, you should have this test every 3 years.    Shots: Get a flu shot each year. Get a tetanus shot every 10 years.     Nutrition:   Eat at least 5 servings of fruits and vegetables each day.  Eat whole-grain bread, whole-wheat pasta and brown rice instead of white grains and rice.  Get adequate Calcium and Vitamin D.      Lifestyle  Exercise at least 150 minutes a week (an average of 30 minutes a day, 5 days a week). This will help you control your weight and prevent disease.  Limit alcohol to one drink per day.  No smoking.   Wear sunscreen to prevent skin cancer.  See your dentist every six months for an exam and cleaning.

## 2022-04-24 ENCOUNTER — LAB (OUTPATIENT)
Dept: LAB | Facility: CLINIC | Age: 47
End: 2022-04-24
Payer: COMMERCIAL

## 2022-04-24 DIAGNOSIS — Z11.59 NEED FOR HEPATITIS C SCREENING TEST: ICD-10-CM

## 2022-04-24 DIAGNOSIS — Z13.1 SCREENING FOR DIABETES MELLITUS: ICD-10-CM

## 2022-04-24 DIAGNOSIS — Z11.4 SCREENING FOR HIV (HUMAN IMMUNODEFICIENCY VIRUS): ICD-10-CM

## 2022-04-24 DIAGNOSIS — Z13.220 SCREENING FOR HYPERLIPIDEMIA: ICD-10-CM

## 2022-04-24 LAB
CHOLEST SERPL-MCNC: 154 MG/DL
FASTING STATUS PATIENT QL REPORTED: YES
FASTING STATUS PATIENT QL REPORTED: YES
GLUCOSE BLD-MCNC: 92 MG/DL (ref 70–99)
HDLC SERPL-MCNC: 44 MG/DL
LDLC SERPL CALC-MCNC: 98 MG/DL
NONHDLC SERPL-MCNC: 110 MG/DL
TRIGL SERPL-MCNC: 62 MG/DL

## 2022-04-24 PROCEDURE — 80061 LIPID PANEL: CPT

## 2022-04-24 PROCEDURE — 36415 COLL VENOUS BLD VENIPUNCTURE: CPT

## 2022-04-24 PROCEDURE — 86803 HEPATITIS C AB TEST: CPT

## 2022-04-24 PROCEDURE — 82947 ASSAY GLUCOSE BLOOD QUANT: CPT

## 2022-04-24 PROCEDURE — 87389 HIV-1 AG W/HIV-1&-2 AB AG IA: CPT

## 2022-04-25 LAB
HCV AB SERPL QL IA: NONREACTIVE
HIV 1+2 AB+HIV1 P24 AG SERPL QL IA: NONREACTIVE

## 2022-04-25 NOTE — RESULT ENCOUNTER NOTE
The results of your recent lipid (cholesterol) profile were mildly abnormal.    Here are the results:  Lab Results       Component                Value               Date                       CHOL                     154                 04/24/2022                 CHOL                     187                 03/10/2017            Lab Results       Component                Value               Date                       HDL                      44                  04/24/2022                 HDL                      39                  03/10/2017            Lab Results       Component                Value               Date                       LDL                      98                  04/24/2022                 LDL                      109                 03/10/2017            Lab Results       Component                Value               Date                       TRIG                     62                  04/24/2022                 TRIG                     196                 03/10/2017            Lab Results       Component                Value               Date                       CHOLHDLRATIO             4.0                 03/24/2015              Desired or goal levels are:  CHOLESTEROL: Desirable is less than 200.   HDL (Good Cholesterol): Desirable is greater than 40 (for men) greater than 50 (for women).  LDL (Bad Cholesterol): Desirable is less than 130 (or less than 100 if you have heart disease or diabetes). Borderline 130-160.  TRIGLYCERIDES: Desirable is less than 150.  Borderline is 150-200.    Your HDL (the good cholesterol) level is low, no medication is required at this point. Reducing your total fat intake, increasing exercise level, reducing weight, adding olive oil to your diet, etc, may help to increase this level..    As you may know, an elevated cholesterol is one factor that increases your risk for heart disease and stroke. You can improve your cholesterol by controlling the amount  and type of fat you eat and by increasing your daily activity level.    Here are some ways to improve your nutrition:  Eat less fat (especially butter, Crisco and other saturated fats)  Buy lean cuts of meat, reduce your portions of red meat or substitute poultry or fish  Use skim milk and low-fat dairy products  Eat no more than 4 egg yolks per week  Avoid fried or fast foods that are high in fat  Eat more fruits and vegetables      Also consider starting or increasing your aerobic activity. Aerobic activity is the best way to improve HDL (good) cholesterol. If this would be new to you, please talk with me first about what activities are safe for you.      Other lab results:    Your HIV, hepatitis C, and blood sugar tests were all normal.    Please feel free to contact us with any questions or if you would like more information.    Hailey Chiang M.D.

## 2022-05-09 ENCOUNTER — OFFICE VISIT (OUTPATIENT)
Dept: DERMATOLOGY | Facility: CLINIC | Age: 47
End: 2022-05-09
Payer: COMMERCIAL

## 2022-05-09 VITALS — DIASTOLIC BLOOD PRESSURE: 84 MMHG | SYSTOLIC BLOOD PRESSURE: 143 MMHG

## 2022-05-09 DIAGNOSIS — L72.11 PILAR CYST: Primary | ICD-10-CM

## 2022-05-09 PROCEDURE — 88304 TISSUE EXAM BY PATHOLOGIST: CPT | Performed by: PATHOLOGY

## 2022-05-09 PROCEDURE — 11423 EXC H-F-NK-SP B9+MARG 2.1-3: CPT | Performed by: PHYSICIAN ASSISTANT

## 2022-05-09 NOTE — LETTER
5/9/2022         RE: Long Maradiaga  [address] [place] [address]        Dear Colleague,    Thank you for referring your patient, Long Maradiaga, to the [facility]. Please see a copy of my visit note below.    HPI:  Long Maradiaga is a 46 year old female patient here today for cyst on scalp .  Patient states this has been present for a while.  Patient reports the following symptoms: bothersome, growing .  Patient reports the following previous treatments: none.  Patient reports the following modifying factors: none.  Associated symptoms: none.  Patient has no other skin complaints today.  Remainder of the HPI, Meds, PMH, Allergies, FH, and SH was reviewed in chart.      Past Medical History:   Diagnosis Date     ASCUS with positive high risk HPV 04/2014    +HPV33/45, colp neg     Cervical high risk HPV (human papillomavirus) test positive 9/2015, 04/4/19    NIL pap, + HPV (not 16 or 18)     Elevated blood pressure 2010    borderline HBP. always higher in office!       Past Surgical History:   Procedure Laterality Date     GENITOURINARY SURGERY      1976 - bladder     KIDNEY SURGERY      as a baby, corrective kidney and bladder surgery     KNEE SURGERY      x 2 1995 and 2004 for ACL and MCL     ORTHOPEDIC SURGERY      1995 - R-ACL/MCL, 2004 - L-ACL        Family History   Problem Relation Age of Onset     Diabetes Mother      Depression Mother      Hypertension Father      Hyperlipidemia Father      Skin Cancer Maternal Grandmother      Breast Cancer Maternal Grandmother      Colon Cancer Maternal Grandfather 60        in his 60s at least       Social History     Socioeconomic History     Marital status:      Spouse name: Not on file     Number of children: Not on file     Years of education: Not on file     Highest education level: Not on file   Occupational History     Not on file   Tobacco Use     Smoking status: Never Smoker     Smokeless tobacco: Never Used   Substance  and Sexual Activity     Alcohol use: Not Currently     Comment: haven't had a drink since Sept 2017     Drug use: No     Sexual activity: Yes     Partners: Male     Birth control/protection: Pull-out method   Other Topics Concern     Parent/sibling w/ CABG, MI or angioplasty before 65F 55M? No   Social History Narrative     Not on file     Social Determinants of Health     Financial Resource Strain: Not on file   Food Insecurity: Not on file   Transportation Needs: Not on file   Physical Activity: Not on file   Stress: Not on file   Social Connections: Not on file   Intimate Partner Violence: Not on file   Housing Stability: Not on file       No outpatient encounter medications on file as of 5/9/2022.     No facility-administered encounter medications on file as of 5/9/2022.       Review Of Systems:  Skin: growth  Eyes: negative  Ears/Nose/Throat: negative  Respiratory: No shortness of breath, dyspnea on exertion, cough, or hemoptysis  Cardiovascular: negative  Gastrointestinal: negative  Genitourinary: negative  Musculoskeletal: negative  Neurologic: negative  Psychiatric: negative  Hematologic/Lymphatic/Immunologic: negative  Endocrine: negative      Objective:     LMP 04/18/2022 (Exact Date)   Eyes: Conjunctivae/lids: Normal   ENT: Lips:  Normal  MSK: Normal  Cardiovascular: Peripheral edema none  Pulm: Breathing Normal  Neuro/Psych: Orientation: A/O x 3. Normal; Mood/Affect: Normal, NAD, WDWN  Pt accompanied by: self  Following areas examined: face, neck, scalp. Pt wearing mask.   Weston skin type:ii   Findings:  Soft mobile smooth nodule on vertex of scalp 2.5cm    Assessment and Plan:     1) pilar cyst 2.5cm    Excision of cyst: Skin was cleansed with surgical cleanser. It was infiltrated with buffered solution of 1% lidocaine with epinephrine. An incision was made with a number 11 blade superficial to the lesion, curette used to dislodge from surrounding tissue, and hemostat used to remove cyst sac and  contents. Minimal yellow keratinous foul smelling drainage with minimal blood was expressed.   Dressing was applied. Patient was discharged in satisfactory condition. Ethilon 4.0 green microfilament   -Four interrupted sutures placed  Based on lesion type may need to completely remove lesion. Patient will be notified in 7-10 days of results. Wound care directions given.         It was a pleasure speaking with Long Maradiaga today.       Follow up in 7-10 day suture removal.         Again, thank you for allowing me to participate in the care of your patient.        Sincerely,        Aixa Escobar PA-C

## 2022-05-09 NOTE — PATIENT INSTRUCTIONS
Sutured Wound Care     [facility]: [phone]    [facility]: [phone]          No strenuous activity for 48 hours. Resume moderate activity in 48 hours. No heavy exercising until you are seen for follow up in one week.     Take Tylenol as needed for discomfort.                         Do not drink alcoholic beverages for 48 hours.     Keep the pressure bandage in place for 24 hours. If the bandage becomes blood tinged or loose, reinforce it with gauze and tape.        (Refer to the reverse side of this page for management of bleeding).    Remove pressure bandage in 24 hours     Leave the flat bandage in place until your follow up appointment.    Keep the bandage dry. Wash around it carefully.    If the tape becomes soiled or starts to come off, reinforce it with additional paper tape.    Do not smoke for 3 weeks; smoking is detrimental to wound healing.    It is normal to have swelling and bruising around the surgical site. The bruising will fade in approximately 10-14 days. Elevate the area to reduce swelling.    Numbness, itchiness and sensitivity to temperature changes can occur after surgery and may take up to 18 months to normalize.      POSSIBLE COMPLICATIONS    BLEEDING:    Leave the bandage in place.  Use tightly rolled up gauze or a cloth to apply direct pressure over the bandage for 20   minutes.  Reapply pressure for an additional 20 minutes if necessary  Call the office or go to the nearest emergency room if pressure fails to stop the bleeding.  Use additional gauze and tape to maintain pressure once the bleeding has stopped.        PAIN:    Post operative pain should slowly get better, never worse.  A severe increase in pain may indicate a problem. Call the office if this occurs.    In case of emergency phone: [phone]

## 2022-05-09 NOTE — PROGRESS NOTES
HPI:  Long Maradiaga is a 46 year old female patient here today for cyst on scalp .  Patient states this has been present for a while.  Patient reports the following symptoms: bothersome, growing .  Patient reports the following previous treatments: none.  Patient reports the following modifying factors: none.  Associated symptoms: none.  Patient has no other skin complaints today.  Remainder of the HPI, Meds, PMH, Allergies, FH, and SH was reviewed in chart.      Past Medical History:   Diagnosis Date     ASCUS with positive high risk HPV 04/2014    +HPV33/45, colp neg     Cervical high risk HPV (human papillomavirus) test positive 9/2015, 04/4/19    NIL pap, + HPV (not 16 or 18)     Elevated blood pressure 2010    borderline HBP. always higher in office!       Past Surgical History:   Procedure Laterality Date     GENITOURINARY SURGERY      1976 - bladder     KIDNEY SURGERY      as a baby, corrective kidney and bladder surgery     KNEE SURGERY      x 2 1995 and 2004 for ACL and MCL     ORTHOPEDIC SURGERY      1995 - R-ACL/MCL, 2004 - L-ACL        Family History   Problem Relation Age of Onset     Diabetes Mother      Depression Mother      Hypertension Father      Hyperlipidemia Father      Skin Cancer Maternal Grandmother      Breast Cancer Maternal Grandmother      Colon Cancer Maternal Grandfather 60        in his 60s at least       Social History     Socioeconomic History     Marital status:      Spouse name: Not on file     Number of children: Not on file     Years of education: Not on file     Highest education level: Not on file   Occupational History     Not on file   Tobacco Use     Smoking status: Never Smoker     Smokeless tobacco: Never Used   Substance and Sexual Activity     Alcohol use: Not Currently     Comment: haven't had a drink since Sept 2017     Drug use: No     Sexual activity: Yes     Partners: Male     Birth control/protection: Pull-out method   Other Topics Concern     Parent/sibling  w/ CABG, MI or angioplasty before 65F 55M? No   Social History Narrative     Not on file     Social Determinants of Health     Financial Resource Strain: Not on file   Food Insecurity: Not on file   Transportation Needs: Not on file   Physical Activity: Not on file   Stress: Not on file   Social Connections: Not on file   Intimate Partner Violence: Not on file   Housing Stability: Not on file       No outpatient encounter medications on file as of 5/9/2022.     No facility-administered encounter medications on file as of 5/9/2022.       Review Of Systems:  Skin: growth  Eyes: negative  Ears/Nose/Throat: negative  Respiratory: No shortness of breath, dyspnea on exertion, cough, or hemoptysis  Cardiovascular: negative  Gastrointestinal: negative  Genitourinary: negative  Musculoskeletal: negative  Neurologic: negative  Psychiatric: negative  Hematologic/Lymphatic/Immunologic: negative  Endocrine: negative      Objective:     LMP 04/18/2022 (Exact Date)   Eyes: Conjunctivae/lids: Normal   ENT: Lips:  Normal  MSK: Normal  Cardiovascular: Peripheral edema none  Pulm: Breathing Normal  Neuro/Psych: Orientation: A/O x 3. Normal; Mood/Affect: Normal, NAD, WDWN  Pt accompanied by: self  Following areas examined: face, neck, scalp. Pt wearing mask.   Weston skin type:ii   Findings:  Soft mobile smooth nodule on vertex of scalp 2.5cm    Assessment and Plan:     1) pilar cyst 2.5cm    Excision of cyst: Skin was cleansed with surgical cleanser. It was infiltrated with buffered solution of 1% lidocaine with epinephrine. An incision was made with a number 11 blade superficial to the lesion, curette used to dislodge from surrounding tissue, and hemostat used to remove cyst sac and contents. Minimal yellow keratinous foul smelling drainage with minimal blood was expressed.   Dressing was applied. Patient was discharged in satisfactory condition. Ethilon 4.0 green microfilament   -Four interrupted sutures placed  Based on lesion  type may need to completely remove lesion. Patient will be notified in 7-10 days of results. Wound care directions given.         It was a pleasure speaking with Long Maradiaga today.       Follow up in 7-10 day suture removal.

## 2022-05-11 LAB
PATH REPORT.COMMENTS IMP SPEC: NORMAL
PATH REPORT.COMMENTS IMP SPEC: NORMAL
PATH REPORT.FINAL DX SPEC: NORMAL
PATH REPORT.GROSS SPEC: NORMAL
PATH REPORT.MICROSCOPIC SPEC OTHER STN: NORMAL
PATH REPORT.RELEVANT HX SPEC: NORMAL

## 2022-05-16 ENCOUNTER — ALLIED HEALTH/NURSE VISIT (OUTPATIENT)
Dept: DERMATOLOGY | Facility: CLINIC | Age: 47
End: 2022-05-16
Payer: COMMERCIAL

## 2022-05-16 DIAGNOSIS — Z48.01 ENCOUNTER FOR CHANGE OR REMOVAL OF SURGICAL WOUND DRESSING: Primary | ICD-10-CM

## 2022-05-16 PROCEDURE — 99207 PR NO CHARGE NURSE ONLY: CPT

## 2022-05-16 NOTE — PROGRESS NOTES
Pt returned to clinic for suture removal. Pt has no complaints, denies pain. Bandage removed from scalp, area cleansed with normal saline. Suture(s) removed.  Site is healing and wound edges approximating well. Ointment and bandage applied.     Advised to watch for signs/sx of infection; spreading redness, drainage, odor, fever. Call or report promptly to clinic. Pt given written instructions and informed to rtc as needed. Patient verbalized understanding.       Long MAGDA Maradiaga comes into clinic today at the request of MIKE Escobar Ordering Provider for Dressing Change scalp.        This service provided today was under the supervising provider of the day MIKE Escobar, who was available if needed.    Molly Alvarado LPN

## 2022-07-14 ENCOUNTER — TELEPHONE (OUTPATIENT)
Dept: OBGYN | Facility: CLINIC | Age: 47
End: 2022-07-14

## 2022-07-14 NOTE — TELEPHONE ENCOUNTER
Left Message for patient to call clinic back. Due to limited provider access Dr. Morales is not taking new patients for annual exams. Patient is encouraged to schedule physical with IM or FP as patients should have PCP.      If patient has any GYN related concerns she can keep appointment for 09/19/22 at 3:30 and then that will turn into a problem visit appointment.   Jaden Colindres, CMA

## 2022-09-26 ENCOUNTER — OFFICE VISIT (OUTPATIENT)
Dept: MIDWIFE SERVICES | Facility: CLINIC | Age: 47
End: 2022-09-26
Payer: COMMERCIAL

## 2022-09-26 VITALS — BODY MASS INDEX: 33.81 KG/M2 | DIASTOLIC BLOOD PRESSURE: 84 MMHG | SYSTOLIC BLOOD PRESSURE: 138 MMHG | WEIGHT: 197 LBS

## 2022-09-26 DIAGNOSIS — Z12.4 PAP SMEAR FOR CERVICAL CANCER SCREENING: Primary | ICD-10-CM

## 2022-09-26 PROCEDURE — 87624 HPV HI-RISK TYP POOLED RSLT: CPT | Performed by: ADVANCED PRACTICE MIDWIFE

## 2022-09-26 PROCEDURE — 99386 PREV VISIT NEW AGE 40-64: CPT | Performed by: ADVANCED PRACTICE MIDWIFE

## 2022-09-26 PROCEDURE — G0145 SCR C/V CYTO,THINLAYER,RESCR: HCPCS | Performed by: ADVANCED PRACTICE MIDWIFE

## 2022-09-26 NOTE — PROGRESS NOTES
Long is a 46 year old  female who presents for annual exam.     Besides routine health maintenance, she has no other health concerns today .  HPI:  Pt here for pap smear. Reports annual physical in April with PCP.    Patient's last menstrual period was 2022..   Using natural family planning for contraception.    She is not currently considering pregnancy.    REPRODUCTIVE/GYNECOLOGIC HISTORY:  Long is sexually active with male partner(s) and is currently in monogamous relationship.   STI testing offered?  Declined  History of abnormal Pap smear:  Yes  SOCIAL HISTORY  Abuse: does not report having previously been physical or sexually abused.    Do you feel safe in your environment? YES       She  reports that she has never smoked. She has never used smokeless tobacco.      HEALTH MAINTENANCE:  Cholesterol: (  Cholesterol   Date Value Ref Range Status   2022 154 <200 mg/dL Final   03/10/2017 187 <200 mg/dL Final   2015 185 <200 mg/dL Final     Comment:     LDL Cholesterol is the primary guide to therapy.   The NCEP recommends further evaluation of: patients with cholesterol greater   than 200 mg/dL if additional risk factors are present, cholesterol greater   than   240 mg/dL, triglycerides greater than 150 mg/dL, or HDL less than 40 mg/dL.      History of abnormal lipids: No  Mammo: Completed 22 . History of abnormal Mammo: No.  Regular Self Breast Exams: Yes  TSH: (  TSH   Date Value Ref Range Status   2014 2.41 0.4 - 5.0 mU/L Final    )  Pap; (  Lab Results   Component Value Date    PAP NIL 2019    PAP NIL 2015    PAP ASC-US 2014    )    Health maintenance updated:  yes        HISTORY:  OB History    Para Term  AB Living   0 0 0 0 0 0   SAB IAB Ectopic Multiple Live Births   0 0 0 0 0     Past Medical History:   Diagnosis Date     ASCUS with positive high risk HPV 2014    +HPV33/45, colp neg     Cervical high risk HPV (human papillomavirus)  test positive 9/2015, 04/4/19    NIL pap, + HPV (not 16 or 18)     Elevated blood pressure 2010    borderline HBP. always higher in office!     Past Surgical History:   Procedure Laterality Date     GENITOURINARY SURGERY      1976 - bladder     KIDNEY SURGERY      as a baby, corrective kidney and bladder surgery     KNEE SURGERY      x 2 1995 and 2004 for ACL and MCL     ORTHOPEDIC SURGERY      1995 - R-ACL/MCL, 2004 - L-ACL     Family History   Problem Relation Age of Onset     Diabetes Mother      Depression Mother      Hypertension Father      Hyperlipidemia Father      Skin Cancer Maternal Grandmother      Breast Cancer Maternal Grandmother      Colon Cancer Maternal Grandfather 60        in his 60s at least     Social History     Socioeconomic History     Marital status:      Spouse name: None     Number of children: None     Years of education: None     Highest education level: None   Tobacco Use     Smoking status: Never Smoker     Smokeless tobacco: Never Used   Substance and Sexual Activity     Alcohol use: Not Currently     Comment: haven't had a drink since Sept 2017     Drug use: No     Sexual activity: Yes     Partners: Male     Birth control/protection: Pull-out method   Other Topics Concern     Parent/sibling w/ CABG, MI or angioplasty before 65F 55M? No     No current outpatient medications on file.   No Known Allergies    Past medical, surgical, social and family history were reviewed and updated in [facility].    ROS:   CONSTITUTIONAL: NEGATIVE for fever, chills, change in weight  INTEGUMENTARU/SKIN: NEGATIVE for worrisome rashes, moles or lesions  EYES: NEGATIVE for vision changes or irritation  ENT: NEGATIVE for ear, mouth and throat problems  RESP: NEGATIVE for significant cough or SOB  BREAST: NEGATIVE for masses, tenderness or discharge  CV: NEGATIVE for chest pain, palpitations or peripheral edema  GI: NEGATIVE for nausea, abdominal pain, heartburn, or change in bowel habits  : NEGATIVE  for unusual urinary or vaginal symptoms. Periods are regular.  MUSCULOSKELETAL: NEGATIVE for significant arthralgias or myalgia  NEURO: NEGATIVE for weakness, dizziness or paresthesias  PSYCHIATRIC: NEGATIVE for changes in mood or affect    PHYSICAL EXAM:  Wt 89.4 kg (197 lb)   LMP 09/17/2022   BMI 33.81 kg/m     BMI: Body mass index is 33.81 kg/m .  Constitutional: healthy, alert and no distress  Neck: symmetrical, thyroid normal size, no masses present, no lymphadenopathy present.   Cardiovascular: RRR, no murmurs present  Respiratory: breathing unlabored, lungs CTA bilaterally  Breast:normal without masses, tenderness or nipple discharge and no palpable axillary masses or adenopathy  Gastrointestinal: abdomen soft, non-tender, bowel sounds present  PELVIC EXAM:  Vulva: No lesions, no adenopathy, BUS WNL  Vagina: Moist, pink, discharge normal  well rugated, no lesions  Cervix:smooth, pink, no visible lesions  Uterus: Normal size, non-tender, mobile, retroverted  Ovaries: No masses palpated  Rectal exam: deferred    ASSESSMENT/PLAN:    ICD-10-CM    1. Pap smear for cervical cancer screening  Z12.4 Pap screen with HPV - recommended age 30 - 65 years     No results found for any visits on 09/26/22.      COUNSELING:   Reviewed preventive health counseling, as reflected in patient instructions       Regular exercise       Mammography     Follow-up as needed      CORINA Bhatia CNM

## 2022-09-29 LAB
BKR LAB AP GYN ADEQUACY: NORMAL
BKR LAB AP GYN INTERPRETATION: NORMAL
BKR LAB AP HPV REFLEX: NORMAL
BKR LAB AP PREVIOUS ABNL DX: NORMAL
BKR LAB AP PREVIOUS ABNORMAL: NORMAL
PATH REPORT.COMMENTS IMP SPEC: NORMAL
PATH REPORT.COMMENTS IMP SPEC: NORMAL
PATH REPORT.RELEVANT HX SPEC: NORMAL

## 2022-10-04 LAB
HUMAN PAPILLOMA VIRUS 16 DNA: NEGATIVE
HUMAN PAPILLOMA VIRUS 18 DNA: NEGATIVE
HUMAN PAPILLOMA VIRUS FINAL DIAGNOSIS: ABNORMAL
HUMAN PAPILLOMA VIRUS OTHER HR: POSITIVE

## 2022-12-26 ENCOUNTER — HEALTH MAINTENANCE LETTER (OUTPATIENT)
Age: 47
End: 2022-12-26

## 2023-01-29 ENCOUNTER — E-VISIT (OUTPATIENT)
Dept: URGENT CARE | Facility: CLINIC | Age: 48
End: 2023-01-29
Payer: COMMERCIAL

## 2023-01-29 ENCOUNTER — OFFICE VISIT (OUTPATIENT)
Dept: URGENT CARE | Facility: URGENT CARE | Age: 48
End: 2023-01-29
Payer: COMMERCIAL

## 2023-01-29 VITALS
HEIGHT: 64 IN | DIASTOLIC BLOOD PRESSURE: 84 MMHG | HEART RATE: 74 BPM | BODY MASS INDEX: 32.44 KG/M2 | TEMPERATURE: 98.8 F | SYSTOLIC BLOOD PRESSURE: 131 MMHG | OXYGEN SATURATION: 99 % | WEIGHT: 190 LBS

## 2023-01-29 DIAGNOSIS — R21 RASH AND NONSPECIFIC SKIN ERUPTION: Primary | ICD-10-CM

## 2023-01-29 DIAGNOSIS — L23.9 ALLERGIC DERMATITIS: Primary | ICD-10-CM

## 2023-01-29 PROCEDURE — 99207 PR NON-BILLABLE SERV PER CHARTING: CPT | Performed by: FAMILY MEDICINE

## 2023-01-29 PROCEDURE — 99213 OFFICE O/P EST LOW 20 MIN: CPT | Performed by: FAMILY MEDICINE

## 2023-01-29 NOTE — PROGRESS NOTES
Subjective: A day and a half ago at night she noticed itchy spots on her arms and noticed little bumps.  Today she has some on her legs that are similar.  Nowhere else on the body.  No other symptoms.  She does not really go much of anywhere as she works from home and has not eaten or done anything different than normal.  She lives with her  who has no similar symptoms.  She takes no medications.    Objective: There are tiny papules scattered randomly on the upper arms.  She says they look the same on her leg so I did not look.  Nothing anywhere else.  NAD.    Assessment and plan: Allergic dermatitis.  These do not appear terribly inflammatory, almost more palpable than visible, probably 20 or 30 on each arm.  They do not have a pattern suggesting scabies.  I think we can just observe them for now.  Benadryl will help at night and she has some hydrocortisone cream to use during the day.  Hopefully this will run its course in a week.  We may never figure out the cause.

## 2023-01-29 NOTE — PATIENT INSTRUCTIONS
Dear Long Maradiaga,    We are sorry you are not feeling well. Based on the responses you provided, it is recommended that you be seen in-person in urgent care so we can better evaluate your symptoms. Please click here to find the nearest urgent care location to you.   You will not be charged for this Visit. Thank you for trusting us with your care.    Viola oY MD

## 2023-01-31 ENCOUNTER — MYC MEDICAL ADVICE (OUTPATIENT)
Dept: DERMATOLOGY | Facility: CLINIC | Age: 48
End: 2023-01-31
Payer: COMMERCIAL

## 2023-01-31 DIAGNOSIS — L30.9 DERMATITIS: Primary | ICD-10-CM

## 2023-02-01 RX ORDER — TRIAMCINOLONE ACETONIDE 1 MG/G
CREAM TOPICAL
Qty: 454 G | Refills: 0 | Status: SHIPPED | OUTPATIENT
Start: 2023-02-01

## 2023-02-01 NOTE — TELEPHONE ENCOUNTER
She should be seen - ok to overbook Tuesday AM if she would like for a very quick fit in. Or can overbook on Friday AM if I get cancellations

## 2023-02-01 NOTE — TELEPHONE ENCOUNTER
I can offer a topical steroid to see if this helps. Order pended. Ok to send to pharmacy of choice.

## 2023-02-07 ENCOUNTER — OFFICE VISIT (OUTPATIENT)
Dept: DERMATOLOGY | Facility: CLINIC | Age: 48
End: 2023-02-07
Payer: COMMERCIAL

## 2023-02-07 DIAGNOSIS — L30.9 DERMATITIS: Primary | ICD-10-CM

## 2023-02-07 PROCEDURE — 99214 OFFICE O/P EST MOD 30 MIN: CPT | Performed by: PHYSICIAN ASSISTANT

## 2023-02-07 NOTE — PROGRESS NOTES
HPI:   Chief complaints: Long Maradiaga is a pleasant 47 year old female who presents for evaluation of an itchy rash. She developed pink itchy bumps which started out on the right forearm and then moved to the left forearm and to her legs from there. She did not switch any personal care products. The went to  and was given hydrocortisone and antihistamines but these did not help. She started TAC cream from me which has helped quite a bit. The rash is nearly resolved today.       PHYSICAL EXAM:    There were no vitals taken for this visit.  Skin exam performed as follows: Type 2 skin. Mood appropriate  Alert and Oriented X 3. Well developed, well nourished in no distress.  General appearance: Normal  Head including face: Normal  Eyes: conjunctiva and lids: Normal  Mouth: Lips, teeth, gums: Normal  Neck: Normal  Cardiovascular: Exam of peripheral vascular system by observation for swelling, varicosities, edema: Normal  Right upper extremity: Normal  Left upper extremity: Normal  Right lower extremity: Normal  Left lower extremity: Normal  Skin: Scalp and body hair: See below    Faint papular dermatitis on bilateral volar forearms    ASSESSMENT/PLAN:     1. Papular eczema vs viral exanthem vs scabies vs other - nearly resolved today.   --TAC cream BID x 1-2 more weeks then PRN only  --Return for biopsy if refractory          Follow-up: PRN  CC:   Scribed By: Sheila Jalloh, MS, PASravanC

## 2023-02-07 NOTE — LETTER
2/7/2023         RE: Long Maradiaga  [address] [place] [address]        Dear Colleague,    Thank you for referring your patient, Long Maradiaga, to the [facility]. Please see a copy of my visit note below.    HPI:   Chief complaints: Long Maradiaga is a pleasant 47 year old female who presents for evaluation of an itchy rash. She developed pink itchy bumps which started out on the right forearm and then moved to the left forearm and to her legs from there. She did not switch any personal care products. The went to  and was given hydrocortisone and antihistamines but these did not help. She started TAC cream from me which has helped quite a bit. The rash is nearly resolved today.       PHYSICAL EXAM:    There were no vitals taken for this visit.  Skin exam performed as follows: Type 2 skin. Mood appropriate  Alert and Oriented X 3. Well developed, well nourished in no distress.  General appearance: Normal  Head including face: Normal  Eyes: conjunctiva and lids: Normal  Mouth: Lips, teeth, gums: Normal  Neck: Normal  Cardiovascular: Exam of peripheral vascular system by observation for swelling, varicosities, edema: Normal  Right upper extremity: Normal  Left upper extremity: Normal  Right lower extremity: Normal  Left lower extremity: Normal  Skin: Scalp and body hair: See below    Faint papular dermatitis on bilateral volar forearms    ASSESSMENT/PLAN:     1. Papular eczema vs viral exanthem vs scabies vs other - nearly resolved today.   --TAC cream BID x 1-2 more weeks then PRN only  --Return for biopsy if refractory          Follow-up: PRN  CC:   Scribed By: Sheila Jalloh, MS, PADIXON          Again, thank you for allowing me to participate in the care of your patient.        Sincerely,        Sheila Jalloh PA-C

## 2023-04-22 ENCOUNTER — HEALTH MAINTENANCE LETTER (OUTPATIENT)
Age: 48
End: 2023-04-22

## 2023-07-13 NOTE — TELEPHONE ENCOUNTER
----- Message from LOY Osuna sent at 7/13/2023  3:44 PM CDT -----  Sodium is low but improving.  Potassium is elevated at 5.6.  Kidney function has improved from about a week ago.  Still severe decrease in function stage 4.  Is intake of fluids that contain potassium and recheck next week.  If still elevated will need to give Kayexalate.   Copied and pasted from RN at Dr. Burgos's clinic,  Patient has been given results. See her MyChart message from me. Dr Burgos will review and update plan   Rajesh Hanna RN-Pap Tracking

## 2023-11-26 ENCOUNTER — HEALTH MAINTENANCE LETTER (OUTPATIENT)
Age: 48
End: 2023-11-26

## 2024-04-14 ENCOUNTER — HEALTH MAINTENANCE LETTER (OUTPATIENT)
Age: 49
End: 2024-04-14

## 2024-04-22 ENCOUNTER — ANCILLARY PROCEDURE (OUTPATIENT)
Dept: MAMMOGRAPHY | Facility: CLINIC | Age: 49
End: 2024-04-22
Attending: FAMILY MEDICINE
Payer: COMMERCIAL

## 2024-04-22 DIAGNOSIS — Z12.31 VISIT FOR SCREENING MAMMOGRAM: ICD-10-CM

## 2024-04-22 PROCEDURE — 77063 BREAST TOMOSYNTHESIS BI: CPT | Mod: TC | Performed by: RADIOLOGY

## 2024-04-22 PROCEDURE — 77067 SCR MAMMO BI INCL CAD: CPT | Mod: TC | Performed by: RADIOLOGY

## 2024-05-13 ENCOUNTER — OFFICE VISIT (OUTPATIENT)
Dept: MIDWIFE SERVICES | Facility: CLINIC | Age: 49
End: 2024-05-13
Payer: COMMERCIAL

## 2024-05-13 VITALS
HEART RATE: 82 BPM | TEMPERATURE: 98.9 F | HEIGHT: 64 IN | BODY MASS INDEX: 33.97 KG/M2 | DIASTOLIC BLOOD PRESSURE: 86 MMHG | WEIGHT: 199 LBS | SYSTOLIC BLOOD PRESSURE: 136 MMHG

## 2024-05-13 DIAGNOSIS — Z12.4 SCREENING FOR CERVICAL CANCER: Primary | ICD-10-CM

## 2024-05-13 PROCEDURE — 87624 HPV HI-RISK TYP POOLED RSLT: CPT | Performed by: ADVANCED PRACTICE MIDWIFE

## 2024-05-13 PROCEDURE — 99459 PELVIC EXAMINATION: CPT | Performed by: ADVANCED PRACTICE MIDWIFE

## 2024-05-13 PROCEDURE — 99212 OFFICE O/P EST SF 10 MIN: CPT | Performed by: ADVANCED PRACTICE MIDWIFE

## 2024-05-13 PROCEDURE — G0145 SCR C/V CYTO,THINLAYER,RESCR: HCPCS | Performed by: ADVANCED PRACTICE MIDWIFE

## 2024-05-13 NOTE — PROGRESS NOTES
Long is a 48 year old  female who presents for a pap smear.  Has Hx of HPV and needs yearly paps. Has Hx of difficult pap smears.     Menses are regular q 28-30 days and normal lasting 3 days.  Menses flow: normal.  Patient's last menstrual period was 2024.. Using none for contraception.  She is not currently considering pregnancy. Has noticed her period becoming shorter and less crampy  GYNECOLOGIC HISTORY:  Menarche: 11  Long is sexually active with 1male partner(s) and is currently in monogamous relationship with.    History sexually transmitted infections:No STD history  STI testing offered?  Accepted  BERHANE exposure: No  History of abnormal Pap smear: NO - age 30- 65 PAP every 3 years recommended  Family history of breast CA: Yes (Please explain): mgmo  Family history of uterine/ovarian CA: No    Family history of colon CA: Yes (Please explain): mgfa       )  Pap; (  Lab Results   Component Value Date    PAP NIL 2019    PAP NIL 2015    PAP ASC-US 2014    )    Current Outpatient Medications:     triamcinolone (KENALOG) 0.1 % external cream, Apply to AA BID x 1-2 week then PRN only (Patient not taking: Reported on 2023), Disp: 454 g, Rfl: 0   No Known Allergies    Past medical, surgical, social and family history were reviewed and updated in EPIC.    ROS:   C:     NEGATIVE for fever, chills, change in weight  I:       NEGATIVE for worrisome rashes, moles or lesions  E:     NEGATIVE for vision changes or irritation  E/M: NEGATIVE for ear, mouth and throat problems  R:     NEGATIVE for significant cough or SOB  CV:   NEGATIVE for chest pain, palpitations or peripheral edema  GI:     NEGATIVE for nausea, abdominal pain, heartburn, or change in bowel habits  :   NEGATIVE for frequency, dysuria, hematuria, vaginal discharge, or irregular bleeding  M:     NEGATIVE for significant arthralgias or myalgia  N:      NEGATIVE for weakness, dizziness or paresthesias  E:      NEGATIVE for  "temperature intolerance, skin/hair changes  P:      NEGATIVE for changes in mood or affect.    EXAM:  /86   Pulse 82   Temp 98.9  F (37.2  C)   Ht 1.626 m (5' 4\")   Wt 90.3 kg (199 lb)   LMP 04/29/2024   BMI 34.16 kg/m     BMI: Body mass index is 34.16 kg/m .  Constitutional: healthy, alert and no distress    :  Vulva:  No external lesions, normal female hair distribution, no inguinal adenopathy.    Urethra:  Midline, non-tender, well supported, no discharge  Vagina:  Moist, pink, no abnormal discharge, no lesions  Uterus:  Normal size,  , non-tender, freely mobile  Ovaries:  No masses appreciated, non-tender, mobile  Rectal Exam: deferred      ASSESSMENT:  48 year old female with satisfactory annual exam  (Z12.4) Screening for cervical cancer  (primary encounter diagnosis)  Comment:   Plan: Pap screen with HPV - recommended age 30 - 65         years            Return to the clinic in one year for your next annual appointment or sooner with concerns.    France Nicolas CNM      "

## 2024-05-16 LAB
BKR LAB AP GYN ADEQUACY: NORMAL
BKR LAB AP GYN INTERPRETATION: NORMAL
BKR LAB AP HPV REFLEX: NORMAL
BKR LAB AP LMP: NORMAL
BKR LAB AP PREVIOUS ABNORMAL: NORMAL
PATH REPORT.COMMENTS IMP SPEC: NORMAL
PATH REPORT.COMMENTS IMP SPEC: NORMAL
PATH REPORT.RELEVANT HX SPEC: NORMAL

## 2024-05-22 LAB
HPV HR 12 DNA CVX QL NAA+PROBE: POSITIVE
HPV16 DNA CVX QL NAA+PROBE: NEGATIVE
HPV18 DNA CVX QL NAA+PROBE: NEGATIVE
HUMAN PAPILLOMA VIRUS FINAL DIAGNOSIS: ABNORMAL

## 2024-05-23 ENCOUNTER — PATIENT OUTREACH (OUTPATIENT)
Dept: MIDWIFE SERVICES | Facility: CLINIC | Age: 49
End: 2024-05-23
Payer: COMMERCIAL

## 2024-05-23 DIAGNOSIS — N87.9 CERVICAL DYSPLASIA: Primary | ICD-10-CM

## 2024-05-23 DIAGNOSIS — R87.610 ASCUS OF CERVIX WITH NEGATIVE HIGH RISK HPV: Primary | ICD-10-CM

## 2024-05-24 ENCOUNTER — PATIENT OUTREACH (OUTPATIENT)
Dept: ONCOLOGY | Facility: CLINIC | Age: 49
End: 2024-05-24
Payer: COMMERCIAL

## 2024-05-24 NOTE — PROGRESS NOTES
"New Patient Hematology / Oncology Nurse Navigator Note     Referral Date: 5/23/24    Referring provider:   France Nicolas APRN CN   [address] [facility] [address]   [facility] [address]   Phone: [phone]   Fax: [phone]       Referring Clinic/Organization: [facility]     Referred to: Dysplasia Clinic    Requested provider (if applicable): [facility]    Evaluation for : \"Needs colposcopy, referral is for the dysplasia clinic. Hx of difficult to find cervix. \"     Clinical History (per Nurse review of records provided):    8/27/19 Office Visit with Dr. Burgos -- BOOKMARKED   5/13/24 PAP/HPV NIL, +HR HPV, not 16/18. Plan [place] bef 8/13/24 -- BOOKMARKED   5/13/24 Office Visit with OBGYN-- BOOKMARKED    Clinical Assessment / Barriers to Care (Per Nurse):  Pt lives in [place], [place]      Records Location: [facility]     Records Needed:   N/A    Additional testing needed prior to consult:   N/A    Referral updates and Plan:   Referral received, chart reviewed by nursing, scheduling instructions updated and routed to NPS for scheduling with [facility]. Will follow-up as needed as patient has already been notified of referral by PAP tracking RN.     Yana Gamino, TONN, RN, PHN, OCN  Hematology/Oncology Nurse Navigator  [facility]  [phone]    "

## 2024-06-29 NOTE — TELEPHONE ENCOUNTER
RECORDS STATUS - ALL OTHER DIAGNOSIS      RECORDS RECEIVED FROM: [facility]   NOTES STATUS DETAILS   OFFICE NOTE from referring provider Epic 05/13/24: CORINA Boyle CNM   OFFICE NOTE from gyn oncologist Epic 08/27/19: Dr. Mirella Burgos   MEDICATION LIST [facility]    LABS     PATHOLOGY REPORTS Reports in Epic PAP & HPV: 05/13/24, 09/26/22, 04/04/19   ANYTHING RELATED TO DIAGNOSIS Epic Most recent 04/24/22      Protopic Counseling: Patient may experience a mild burning sensation during topical application. Protopic is not approved in children less than 2 years of age. There have been case reports of hematologic and skin malignancies in patients using topical calcineurin inhibitors although causality is questionable.

## 2024-07-10 NOTE — PROGRESS NOTES
Consult Note  [facility] Gynecologic Oncology HPV Clinic  [facility]      Referring provider:    CORINA Campos CNM  [address]  [place] [address]      Primary Care Provider:  Hailey Chiang  [address] [facility] [address]  [place] [address]       Patient: Long Maradiaga  : 1975  Language: English   Pronouns: She/hers     Date of Visit: 2024       Reason for visit: Persistent non-16/18 hrHPV+ cervical cancer screening.     History of Present Illness:  Long Maradiaga is a 48 year old patient referred to the [facility] Gynecologic Oncology HPV Clinic for management of persistent non-16/18 hrHPV+ cervical cancer screening. History as follows:    *HPV vaccination status: Unvaccinated.    Cervical Cancer Screening History:  14: Pap test ASCUS, non-16/18 hrHPV+ (hrHPV33 or 45+).   14: Endocervical curettings pathology negative.     9/16/15: Pap test NILM, non-16/18 hrHPV+.   10/6/15: Endocervical curettings pathology negative.     [id number]: Lost to follow-up.     19: Pap test NILM, non-16/18 hrHPV+.   19: Cervical biopsy & endocervical curettings pathology negative.     22: Pap test NILM, non-16/18 hrHPV+.     24: Pap test NILM, non-16/18 hrHPV+.       Subjective:  Long Maradiaga presents to the Gyn Onc HPV Clinic today for a scheduled consultation, Un-accompanied.  No symptoms. No abnormal bleeding. No abnormal discharge. No persistent vulvar itching or lesions noted.       Medical History:  Past Medical History:   Diagnosis Date    ASCUS with positive high risk HPV 2014    +HPV33/45, colp neg    Cervical high risk HPV (human papillomavirus) test positive 2015, 19    NIL pap, + HPV (not 16 or 18)    Elevated blood pressure     borderline HBP. always higher in office!     Surgical History:  Past Surgical History:   Procedure Laterality Date    GENITOURINARY SURGERY       - bladder    KIDNEY SURGERY      as a baby, corrective kidney and bladder  "surgery    KNEE SURGERY      x 2  and  for ACL and MCL    ORTHOPEDIC SURGERY       - R-ACL/MCL,  - L-ACL     Gynecologic History:  Menstrual/Menopause status: Regular menstrual periods.   Hormone therapy/Contraception status: None.  History of STIS: None   Current sexual activity status:  Sexually active with a male partner.     Obstetric History:  OB History    Para Term  AB Living   0 0 0 0 0 0   SAB IAB Ectopic Multiple Live Births   0 0 0 0 0        Medications:   Current Outpatient Medications   Medication Sig Dispense Refill    triamcinolone (KENALOG) 0.1 % external cream Apply to AA BID x 1-2 week then PRN only 454 g 0     No current facility-administered medications for this visit.      Allergies:   No Known Allergies     Social History:  Patient lives with  in [facility].   Work status: Works from home, . Activity: Regularly goes to the gym. Uses the [employer].   Advanced Directives: No. Desired Healthcare Power of :    Social History     Tobacco Use    Smoking status: Never    Smokeless tobacco: Never   Vaping Use    Vaping status: Never Used   Substance Use Topics    Alcohol use: Not Currently     Comment: haven't had a drink since 2017    Drug use: No      Family History:  Family history significant for a second-degree maternal relative with breast cancer and second-degree maternal relative with colon cancer.   No known family history of ovarian, uterine, urothelial/renal, prostate or pancreatic cancers.  No known family history of melanoma.  Family History   Problem Relation Age of Onset    Diabetes Mother     Depression Mother     Hypertension Father     Hyperlipidemia Father     Skin Cancer Maternal Grandmother     Breast Cancer Maternal Grandmother     Colon Cancer Maternal Grandfather 60        in his 60s at least       Physical Exam:   BP (!) 147/87   Pulse 73   Temp 98.2  F (36.8  C) (Oral)   Resp 14   Ht 1.626 m (5' 4.02\")   " Wt 90.3 kg (199 lb)   LMP 04/29/2024   SpO2 99%   BMI 34.14 kg/m    Body mass index is 34.14 kg/m .     General Appearance: healthy and alert, no distress     Musculoskeletal: extremities non tender and without edema    Skin: no lesions or rashes     Neurological: no gross defects     Psychiatric: appropriate mood and affect                               Genitourinary: External genitalia and urethral meatus appears normal.  Speculum exam with small speculum. Vagina is grossly normal. Cervix is grossly normal.   Verbal consent provided by the patient prior to pelvic exam.  Each step of the exam was verbalized throughout.  Present for the exam: Rachel Earl MD (PGY4)--exam and procedure performed by Dr. Earl under my direct supervision.       Procedure Risk Statement:   The patient was counseled regarding risks, benefits and alternatives to colposcopy, possible biopsies.  Risks discussed include but not limited to:  Bleeding; infection; injury to adjacent organs; inadequate sample or false negative result.  The patient's questions were answered, and informed consent signed.       Procedure:   Colposcopy:  After the informed consent was signed and time-out procedure was performed, dilute acetic acid was applied to the cervix. Colposcopy performed.  -Squamocolumnar junction fully visualized? no  -Acetowhite changes? no  -Punctations, mosaicism, abnormal vasculature, other abnormalities? none  -Clinical Impression: Normal   -Specimens: Endocervical curettings   Cervix grasped at the anterior lip with a single-tooth tenaculum. Sharp curettage performed with Kavorkian curette, followed by endocervical brushing.          Laboratory Examination:  4/24/22: HIV test: Nonreactive.   I have independently reviewed & interpreted the cervical cancer screening & pathology results detailed in the HPI.       Performance Status:  ECOG Grade 0.       Assessment:  Long Maradiaga is a 48 year old patient with a diagnosis of  persistent non-16/18 hrHPV+ cervical cancer screening.   Colposcopic impression normal, pathology pending.        Plan:   1) Persistent hrHPV: I reviewed the screening results and colposcopic findings with Long and discussed recommended management. Recommend continued screening per ASCCP guidelines, which in the setting of persistent hrHPV recommend annual testing.     After discussion of risks/benefits of all options, Long would like to proceed with continued surveillance. Plan as follows:  -If pathology HSIL: RTC for discussion of excisional procedure via LEEP.   -If pathology LSIL/negative: RTC in 1 year for repeat HPV-based testing.     2) Genetic risk factors assessed: Does not meet criteria for Genetics referral.     3) Labs/tests ordered: Surgical pathology: Endocervical curettings.   Will notify patient of results via Sols.     4) Health maintenance issues addressed today: None.    5) Code status: Full-code.     6) Prescriptions: None.        A total of 20 minutes was spent with the patient, 12 minutes of which were spent in counseling/treatment planning, 8 minutes of which were spent in procedure time; an additional 5 minutes was spent in chart review (including review of labs) and documentation.         Shelbie Schofield MD, MS, FACOG, FACS  7/11/2024  3:28 PM

## 2024-07-10 NOTE — PATIENT INSTRUCTIONS
SCHEDULING:  -RTC in 1 year for cervical cancer screening (MD, in-person, HPV clinic) --order(s) placed       DIAGNOSIS:  Persistent non-16/18 hrHPV+ cervical cancer screening.      PLAN:  1) hrHPV: Annual screening is recommended in the setting of persistent hrHPV.   Plan as follows:  -If pathology HSIL: RTC to discuss treatment.  -If pathology LSIL/negative: RTC in 1 year for HPV-based testing.    Please call with any questions or concerns. [phone] (after-hours ask for gyn onc)       Shelbie Schofield MD, MS, FACOG, FACS  7/11/2024  3:25 PM

## 2024-07-11 ENCOUNTER — PRE VISIT (OUTPATIENT)
Dept: ONCOLOGY | Facility: CLINIC | Age: 49
End: 2024-07-11
Payer: COMMERCIAL

## 2024-07-11 ENCOUNTER — OFFICE VISIT (OUTPATIENT)
Dept: ONCOLOGY | Facility: CLINIC | Age: 49
End: 2024-07-11
Attending: ADVANCED PRACTICE MIDWIFE
Payer: COMMERCIAL

## 2024-07-11 VITALS
WEIGHT: 199 LBS | SYSTOLIC BLOOD PRESSURE: 147 MMHG | HEIGHT: 64 IN | HEART RATE: 73 BPM | DIASTOLIC BLOOD PRESSURE: 87 MMHG | TEMPERATURE: 98.2 F | RESPIRATION RATE: 14 BRPM | BODY MASS INDEX: 33.97 KG/M2 | OXYGEN SATURATION: 99 %

## 2024-07-11 DIAGNOSIS — N72 HIGH RISK HUMAN PAPILLOMA VIRUS (HPV) INFECTION OF CERVIX: Primary | ICD-10-CM

## 2024-07-11 DIAGNOSIS — B97.7 HIGH RISK HUMAN PAPILLOMA VIRUS (HPV) INFECTION OF CERVIX: Primary | ICD-10-CM

## 2024-07-11 DIAGNOSIS — N87.9 CERVICAL DYSPLASIA: ICD-10-CM

## 2024-07-11 PROCEDURE — 57456 ENDOCERV CURETTAGE W/SCOPE: CPT | Performed by: OBSTETRICS & GYNECOLOGY

## 2024-07-11 PROCEDURE — 88305 TISSUE EXAM BY PATHOLOGIST: CPT | Mod: 26 | Performed by: PATHOLOGY

## 2024-07-11 PROCEDURE — 88305 TISSUE EXAM BY PATHOLOGIST: CPT | Mod: TC | Performed by: OBSTETRICS & GYNECOLOGY

## 2024-07-11 PROCEDURE — 99213 OFFICE O/P EST LOW 20 MIN: CPT | Mod: 25 | Performed by: OBSTETRICS & GYNECOLOGY

## 2024-07-11 PROCEDURE — 99202 OFFICE O/P NEW SF 15 MIN: CPT | Mod: 25 | Performed by: OBSTETRICS & GYNECOLOGY

## 2024-07-11 ASSESSMENT — PAIN SCALES - GENERAL: PAINLEVEL: NO PAIN (0)

## 2024-07-11 NOTE — Clinical Note
Fito Hough Thank you for referring Long Maradiaga to the gyn onc HPV clinic for evaluation of persistent non-16/18 hrHPV+ cervical cancer screening. Colposcopic exam was normal today, endocervical curettings pathology pending due to inability to visualize the entire transformation zone. If sampling negative for HSIL, Long will return to the gyn onc HPV clinic in 1 year for continued screening. Please contact me if you have any questions or concerns.  --jessica Schofield MD, MS, FACOG, FACS 7/11/2024 3:30 PM

## 2024-07-11 NOTE — NURSING NOTE
"Oncology Rooming Note    July 11, 2024 2:37 PM   Long Maradiaga is a 48 year old female who presents for:    Chief Complaint   Patient presents with    Oncology Clinic Visit     New patient     Initial Vitals: BP (!) 147/87   Pulse 73   Temp 98.2  F (36.8  C) (Oral)   Resp 14   Ht 1.626 m (5' 4.02\")   Wt 90.3 kg (199 lb)   LMP 04/29/2024   SpO2 99%   BMI 34.14 kg/m   Estimated body mass index is 34.14 kg/m  as calculated from the following:    Height as of this encounter: 1.626 m (5' 4.02\").    Weight as of this encounter: 90.3 kg (199 lb). Body surface area is 2.02 meters squared.  No Pain (0) Comment: Data Unavailable   Patient's last menstrual period was 04/29/2024.  Allergies reviewed: Yes  Medications reviewed: Yes    Medications: Medication refills not needed today.  Pharmacy name entered into [facility]: [id number] - [place], [place] - [address] AT [facility] [address]    Frailty Screening:   Is the patient here for a new oncology consult visit in cancer care? 2. No      Clinical concerns: New patient       Alyson Mohr MA              "

## 2024-07-11 NOTE — LETTER
2024      Long Maradiaga  [address]  [place] [address]      Dear Colleague,    Thank you for referring your patient, Long Maradiaga, to the [facility]. Please see a copy of my visit note below.    Consult Note  [facility] Gynecologic Oncology HPV Clinic  [facility]      Referring provider:    CORINA Campos CNM  [address]  [place] [address]      Primary Care Provider:  Hailey Chiang  [address]  [place] [address]       Patient: Long Maradiaga  : 1975  Language: English   Pronouns: She/hers     Date of Visit: 2024       Reason for visit: Persistent non-16/18 hrHPV+ cervical cancer screening.     History of Present Illness:  Long Maradiaga is a 48 year old patient referred to the [facility] Gynecologic Oncology HPV Clinic for management of persistent non-16/18 hrHPV+ cervical cancer screening. History as follows:    *HPV vaccination status: Unvaccinated.    Cervical Cancer Screening History:  14: Pap test ASCUS, non-16/18 hrHPV+ (hrHPV33 or 45+).   14: Endocervical curettings pathology negative.     9/16/15: Pap test NILM, non-16/18 hrHPV+.   10/6/15: Endocervical curettings pathology negative.     1090-3042: Lost to follow-up.     19: Pap test NILM, non-16/18 hrHPV+.   19: Cervical biopsy & endocervical curettings pathology negative.     22: Pap test NILM, non-16/18 hrHPV+.     24: Pap test NILM, non-16/18 hrHPV+.       Subjective:  Long Maradiaga presents to the Gyn Onc HPV Clinic today for a scheduled consultation, Un-accompanied.  No symptoms. No abnormal bleeding. No abnormal discharge. No persistent vulvar itching or lesions noted.       Medical History:  Past Medical History:   Diagnosis Date     ASCUS with positive high risk HPV 2014    +HPV33/45, colp neg     Cervical high risk HPV (human papillomavirus) test positive 2015, 19    NIL pap, + HPV (not 16 or 18)     Elevated blood  pressure 2010    borderline HBP. always higher in office!     Surgical History:  Past Surgical History:   Procedure Laterality Date     GENITOURINARY SURGERY       - bladder     KIDNEY SURGERY      as a baby, corrective kidney and bladder surgery     KNEE SURGERY      x 2  and  for ACL and MCL     ORTHOPEDIC SURGERY       - R-ACL/MCL,  - L-ACL     Gynecologic History:  Menstrual/Menopause status: Regular menstrual periods.   Hormone therapy/Contraception status: None.  History of STIS: None   Current sexual activity status:  Sexually active with a male partner.     Obstetric History:  OB History    Para Term  AB Living   0 0 0 0 0 0   SAB IAB Ectopic Multiple Live Births   0 0 0 0 0        Medications:   Current Outpatient Medications   Medication Sig Dispense Refill     triamcinolone (KENALOG) 0.1 % external cream Apply to AA BID x 1-2 week then PRN only 454 g 0     No current facility-administered medications for this visit.      Allergies:   No Known Allergies     Social History:  Patient lives with  in [facility].   Work status: Works from home, . Activity: Regularly goes to the gym. Uses the [employer].   Advanced Directives: No. Desired Healthcare Power of :    Social History     Tobacco Use     Smoking status: Never     Smokeless tobacco: Never   Vaping Use     Vaping status: Never Used   Substance Use Topics     Alcohol use: Not Currently     Comment: haven't had a drink since 2017     Drug use: No      Family History:  Family history significant for a second-degree maternal relative with breast cancer and second-degree maternal relative with colon cancer.   No known family history of ovarian, uterine, urothelial/renal, prostate or pancreatic cancers.  No known family history of melanoma.  Family History   Problem Relation Age of Onset     Diabetes Mother      Depression Mother      Hypertension Father      Hyperlipidemia Father      Skin  "Cancer Maternal Grandmother      Breast Cancer Maternal Grandmother      Colon Cancer Maternal Grandfather 60        in his 60s at least       Physical Exam:   BP (!) 147/87   Pulse 73   Temp 98.2  F (36.8  C) (Oral)   Resp 14   Ht 1.626 m (5' 4.02\")   Wt 90.3 kg (199 lb)   LMP 04/29/2024   SpO2 99%   BMI 34.14 kg/m    Body mass index is 34.14 kg/m .     General Appearance: healthy and alert, no distress     Musculoskeletal: extremities non tender and without edema    Skin: no lesions or rashes     Neurological: no gross defects     Psychiatric: appropriate mood and affect                               Genitourinary: External genitalia and urethral meatus appears normal.  Speculum exam with small speculum. Vagina is grossly normal. Cervix is grossly normal.   Verbal consent provided by the patient prior to pelvic exam.  Each step of the exam was verbalized throughout.  Present for the exam: Rachel Earl MD (PGY4)--exam and procedure performed by Dr. Earl under my direct supervision.       Procedure Risk Statement:   The patient was counseled regarding risks, benefits and alternatives to colposcopy, possible biopsies.  Risks discussed include but not limited to:  Bleeding; infection; injury to adjacent organs; inadequate sample or false negative result.  The patient's questions were answered, and informed consent signed.       Procedure:   Colposcopy:  After the informed consent was signed and time-out procedure was performed, dilute acetic acid was applied to the cervix. Colposcopy performed.  -Squamocolumnar junction fully visualized? no  -Acetowhite changes? no  -Punctations, mosaicism, abnormal vasculature, other abnormalities? none  -Clinical Impression: Normal   -Specimens: Endocervical curettings   Cervix grasped at the anterior lip with a single-tooth tenaculum. Sharp curettage performed with Kavorkian curette, followed by endocervical brushing.          Laboratory Examination:  4/24/22: HIV " test: Nonreactive.   I have independently reviewed & interpreted the cervical cancer screening & pathology results detailed in the HPI.       Performance Status:  ECOG Grade 0.       Assessment:  Long Maradiaga is a 48 year old patient with a diagnosis of persistent non-16/18 hrHPV+ cervical cancer screening.   Colposcopic impression normal, pathology pending.        Plan:   1) Persistent hrHPV: I reviewed the screening results and colposcopic findings with Long and discussed recommended management. Recommend continued screening per ASCCP guidelines, which in the setting of persistent hrHPV recommend annual testing.     After discussion of risks/benefits of all options, Long would like to proceed with continued surveillance. Plan as follows:  -If pathology HSIL: RTC for discussion of excisional procedure via LEEP.   -If pathology LSIL/negative: RTC in 1 year for repeat HPV-based testing.     2) Genetic risk factors assessed: Does not meet criteria for Genetics referral.     3) Labs/tests ordered: Surgical pathology: Endocervical curettings.   Will notify patient of results via [facility] [employer] [facility].     4) Health maintenance issues addressed today: None.    5) Code status: Full-code.     6) Prescriptions: None.        A total of 20 minutes was spent with the patient, 12 minutes of which were spent in counseling/treatment planning, 8 minutes of which were spent in procedure time; an additional 5 minutes was spent in chart review (including review of labs) and documentation.         Shelbie Schofield MD, MS, FACOG, FACS  7/11/2024  3:28 PM            Again, thank you for allowing me to participate in the care of your patient.        Sincerely,        Shelbie Schofield MD

## 2024-07-15 LAB
PATH REPORT.COMMENTS IMP SPEC: NORMAL
PATH REPORT.COMMENTS IMP SPEC: NORMAL
PATH REPORT.FINAL DX SPEC: NORMAL
PATH REPORT.GROSS SPEC: NORMAL
PATH REPORT.MICROSCOPIC SPEC OTHER STN: NORMAL
PATH REPORT.RELEVANT HX SPEC: NORMAL
PHOTO IMAGE: NORMAL

## 2024-07-22 ENCOUNTER — PATIENT OUTREACH (OUTPATIENT)
Dept: MIDWIFE SERVICES | Facility: CLINIC | Age: 49
End: 2024-07-22
Payer: COMMERCIAL

## 2024-07-22 DIAGNOSIS — R87.610 ASCUS OF CERVIX WITH NEGATIVE HIGH RISK HPV: Primary | ICD-10-CM

## 2024-07-22 NOTE — TELEPHONE ENCOUNTER
7/11/24 [facility] with dysplasia clinic: ECC, benign. Plan: Return for repeat HPV-based testing in 1 year as previously scheduled.

## 2024-09-26 ENCOUNTER — OFFICE VISIT (OUTPATIENT)
Dept: DERMATOLOGY | Facility: CLINIC | Age: 49
End: 2024-09-26
Payer: COMMERCIAL

## 2024-09-26 DIAGNOSIS — D22.9 MULTIPLE NEVI: Primary | ICD-10-CM

## 2024-09-26 DIAGNOSIS — L82.1 SEBORRHEIC KERATOSES: ICD-10-CM

## 2024-09-26 DIAGNOSIS — L72.11 PILAR CYST: ICD-10-CM

## 2024-09-26 DIAGNOSIS — D18.01 CHERRY ANGIOMA: ICD-10-CM

## 2024-09-26 DIAGNOSIS — L81.4 LENTIGINES: ICD-10-CM

## 2024-09-26 DIAGNOSIS — Z12.83 ENCOUNTER FOR SCREENING FOR MALIGNANT NEOPLASM OF SKIN: ICD-10-CM

## 2024-09-26 PROCEDURE — 99213 OFFICE O/P EST LOW 20 MIN: CPT | Performed by: PHYSICIAN ASSISTANT

## 2024-09-26 NOTE — PROGRESS NOTES
[facility] Dermatology Note  Encounter Date: Sep 26, 2024  Office Visit     Reviewed patients past medical history and pertinent chart review prior to patients visit today.     Dermatology Problem List:  # Pilar cyst  - excision scheduled    ____________________________________________    Assessment & Plan:     # Pilar cyst  - Reviewed benign nature of the lesion. The patient is interested in excision (minor surgery). We discussed risks including infection, scarring, and loss of hair over the site. Excision scheduled.    # Multiple nevi, trunk and extremities  # Solar lentigines  - No concerning features on dermoscopy. We discussed the importance of self exams at home. ABCDE criteria and importance of photoprotection reviewed.     # Cherry angiomas  # Seborrheic keratoses  - We discussed the benign nature of the skin lesions. No treatment required. Continued observation recommended. Follow up with any concerns.      Follow-up:  Annual for follow up full body skin exam, as needed for new or changing lesions or new concerns    All risks, benefits and alternatives were discussed with patient.  Patient is in agreement and understands the assessment and plan.  All questions were answered.  Jazzmine Elias PA-C  [facility] Dermatology    ____________________________________________    CC: Skin Check (FBSC/Cyst on head)    HPI:  Ms. Long Maradiaga is a(n) 48 year old female who presents today as a return patient for a full body skin cancer screening. The patient requests evaluation of a lesion on the scalp. The lesion is bothersome when the patient has a massage or gets her hair cut. No other specific cutaneous concerns today. The patient reports trying to be diligent with photoprotection.      Physical Exam:  Vitals: There were no vitals taken for this visit.  SKIN: Total skin excluding the genitalia areas was performed. The exam included the scalp, face, neck, bilateral arms, chest, back,  abdomen, bilateral legs, digits, mons pubis, buttocks, and nails.   - Weston II.  - The left temporal scalp demonstrates a mobile, well demarcated nodule, consistent with an pilar cyst.   - Multiple tan/brown macules and papules scattered throughout exam, consistent with benign nevi. No concerning features on dermoscopy.   - Scattered tan, homogenous macules scattered on sun exposed skin, consistent with solar lentigines.   - Scattered waxy, stuck on appearing papules and patches, consistent with seborrheic keratoses.    - Several 1-2 mm red dome shaped symmetric papules, consistent with cherry angiomas.     Medications:  Current Outpatient Medications   Medication Sig Dispense Refill    triamcinolone (KENALOG) 0.1 % external cream Apply to AA BID x 1-2 week then PRN only 454 g 0     No current facility-administered medications for this visit.      Past Medical History:   Patient Active Problem List   Diagnosis    harper JOINT PAIN-LOWER LEG    CARDIOVASCULAR SCREENING; LDL GOAL LESS THAN 160    ASCUS of cervix with negative high risk HPV    Obesity     Past Medical History:   Diagnosis Date    ASCUS with positive high risk HPV 04/2014    +HPV33/45, colp neg    Cervical high risk HPV (human papillomavirus) test positive 9/2015, 04/4/19    NIL pap, + HPV (not 16 or 18)    Elevated blood pressure 2010    borderline HBP. always higher in office!       CC Referred Self, MD  No address on file on close of this encounter.

## 2024-09-26 NOTE — PATIENT INSTRUCTIONS
Proper skin care from [facility]:    -Eliminate harsh soaps as they strip the natural oils from the skin, often resulting in dry itchy skin ( i.e. Dial, Zest, Marshallese Spring)  -Use mild soaps such as Cetaphil or Dove Sensitive Skin in the shower. You do not need to use soap on arms, legs, and trunk every time you shower unless visibly soiled.   -Avoid hot or cold showers.  -After showering, lightly dry off and apply moisturizing within 2-3 minutes. This will help trap moisture in the skin.   -Aggressive use of a moisturizer at least 1-2 times a day to the entire body (including -Vanicream, Cetaphil, Aquaphor or Cerave) and moisturize hands after every washing.  -We recommend using moisturizers that come in a tub that needs to be scooped out, not a pump. This has more of an oil base. It will hold moisture in your skin much better than a water base moisturizer. The above recommended are non-pore clogging.      Wear a sunscreen with at least SPF 30 on your face, ears, neck and V of the chest daily. Wear sunscreen on other areas of the body if those areas are exposed to the sun throughout the day. Sunscreens can contain physical and/or chemical blockers. Physical blockers are less likely to clog pores, these include zinc oxide and titanium dioxide. Reapply every two hour and after swimming.     Sunscreen examples: https://www.ewg.org/sunscreen/    UV radiation  UVA radiation remains constant throughout the day and throughout the year. It is a longer wavelength than UVB and therefore penetrates deeper into the skin leading to immediate and delayed tanning, photoaging, and skin cancer. 70-80% of UVA and UVB radiation occurs between the hours of 10am-2pm.  UVB radiation  UVB radiation causes the most harmful effects and is more significant during the summer months. However, snow and ice can reflect UVB radiation leading to skin damage during the winter months as well. UVB radiation is responsible for tanning,  burning, inflammation, delayed erythema (pinkness), pigmentation (brown spots), and skin cancer.     I recommend self monthly full body exams and yearly full body exams with a dermatology provider. If you develop a new or changing lesion please follow up for examination. Most skin cancers are pink and scaly or pink and pearly. However, we do see blue/brown/black skin cancers.  Consider the ABCDEs of melanoma when giving yourself your monthly full body exam ( don't forget the groin, buttocks, feet, toes, etc). A-asymmetry, B-borders, C-color, D-diameter, E-elevation or evolving. If you see any of these changes please follow up in clinic. If you cannot see your back I recommend purchasing a hand held mirror to use with a larger wall mirror.       Checking for Skin Cancer  You can find cancer early by checking your skin each month. There are 3 kinds of skin cancer. They are melanoma, basal cell carcinoma, and squamous cell carcinoma. Doing monthly skin checks is the best way to find new marks or skin changes. Follow the instructions below for checking your skin.   The ABCDEs of checking moles for melanoma   Check your moles or growths for signs of melanoma using ABCDE:   Asymmetry: the sides of the mole or growth don t match  Border: the edges are ragged, notched, or blurred  Color: the color within the mole or growth varies  Diameter: the mole or growth is larger than 6 mm (size of a pencil eraser)  Evolving: the size, shape, or color of the mole or growth is changing (evolving is not shown in the images below)    Checking for other types of skin cancer  Basal cell carcinoma or squamous cell carcinoma have symptoms such as:     A spot or mole that looks different from all other marks on your skin  Changes in how an area feels, such as itching, tenderness, or pain  Changes in the skin's surface, such as oozing, bleeding, or scaliness  A sore that does not heal  New swelling or redness beyond the border of a  mole    Who s at risk?  Anyone can get skin cancer. But you are at greater risk if you have:   Fair skin, light-colored hair, or light-colored eyes  Many moles or abnormal moles on your skin  A history of sunburns from sunlight or tanning beds  A family history of skin cancer  A history of exposure to radiation or chemicals  A weakened immune system  If you have had skin cancer in the past, you are at risk for recurring skin cancer.   How to check your skin  Do your monthly skin checkups in front of a full-length mirror. Check all parts of your body, including your:   Head (ears, face, neck, and scalp)  Torso (front, back, and sides)  Arms (tops, undersides, upper, and lower armpits)  Hands (palms, backs, and fingers, including under the nails)  Buttocks and genitals  Legs (front, back, and sides)  Feet (tops, soles, toes, including under the nails, and between toes)  If you have a lot of moles, take digital photos of them each month. Make sure to take photos both up close and from a distance. These can help you see if any moles change over time.   Most skin changes are not cancer. But if you see any changes in your skin, call your doctor right away. Only he or she can diagnose a problem. If you have skin cancer, seeing your doctor can be the first step toward getting the treatment that could save your life.   [employer] last reviewed this educational content on 4/1/2019 2000-2020 [employer]. [address] [place] [address], [place] [address]. All rights reserved. This information is not intended as a substitute for professional medical care. Always follow your healthcare professional's instructions.       When should I call my doctor?  If you are worsening or not improving, please, contact us or seek urgent care as noted below.     Who should I call with questions (adults)?    [facility] [phone]  For urgent needs outside of business hours call the [facility] at  [phone] and ask for the dermatology resident on call to be paged  If this is a medical emergency and you are unable to reach an ER, Call 911      If you need a prescription refill, please contact your pharmacy. Refills are approved or denied by our Physicians during normal business hours, Monday through Fridays  Per office policy, refills will not be granted if you have not been seen within the past year (or sooner depending on your child's condition)

## 2024-09-26 NOTE — LETTER
9/26/2024      Long Maradiaga  [address] [place] [address]      Dear Colleague,    Thank you for referring your patient, Long Maradiaga, to the [facility]. Please see a copy of my visit note below.    [facility] Dermatology Note  Encounter Date: Sep 26, 2024  Office Visit     Reviewed patients past medical history and pertinent chart review prior to patients visit today.     Dermatology Problem List:  # Pilar cyst  - excision scheduled    ____________________________________________    Assessment & Plan:     # Pilar cyst  - Reviewed benign nature of the lesion. The patient is interested in excision (minor surgery). We discussed risks including infection, scarring, and loss of hair over the site. Excision scheduled.    # Multiple nevi, trunk and extremities  # Solar lentigines  - No concerning features on dermoscopy. We discussed the importance of self exams at home. ABCDE criteria and importance of photoprotection reviewed.     # Cherry angiomas  # Seborrheic keratoses  - We discussed the benign nature of the skin lesions. No treatment required. Continued observation recommended. Follow up with any concerns.      Follow-up:  Annual for follow up full body skin exam, as needed for new or changing lesions or new concerns    All risks, benefits and alternatives were discussed with patient.  Patient is in agreement and understands the assessment and plan.  All questions were answered.  Jazzmine Elias PA-C  [facility] Dermatology    ____________________________________________    CC: Skin Check (FBSC/Cyst on head)    HPI:  Ms. Long Maradiaga is a(n) 48 year old female who presents today as a return patient for a full body skin cancer screening. The patient requests evaluation of a lesion on the scalp. The lesion is bothersome when the patient has a massage or gets her hair cut. No other specific cutaneous concerns today. The patient reports trying to be diligent  History  Chief Complaint   Patient presents with    Ankle Pain     right- received call back from PA to report to ED for splinting of extremity      This is a 50-year-old female patient was seen here yesterday and diagnosed with a sprain of the right ankle  It was noted that she had a small avulsion fracture noted by Radiology  She still has pain over the lateral aspect of her right ankle she is able to walk but is painful  No numbness or tingling no other symptoms no pain at the fibular head or 5th metatarsal   Today I will place a posterior OCL on the lower extremity have her follow-up with Orthopedics she will also be referred with crutches  She has taken motion over-the-counter which helps with the pain          Prior to Admission Medications   Prescriptions Last Dose Informant Patient Reported? Taking?   ibuprofen (MOTRIN) 600 mg tablet   No No   Sig: Take 1 tablet (600 mg total) by mouth every 6 (six) hours as needed for mild pain      Facility-Administered Medications: None       History reviewed  No pertinent past medical history  History reviewed  No pertinent surgical history  History reviewed  No pertinent family history  I have reviewed and agree with the history as documented  E-Cigarette/Vaping    E-Cigarette Use Never User      E-Cigarette/Vaping Substances     Social History     Tobacco Use    Smoking status: Never Smoker    Smokeless tobacco: Never Used   Substance Use Topics    Alcohol use: Never     Frequency: Never    Drug use: Not Currently       Review of Systems   Constitutional: Negative for fatigue and fever  HENT: Negative for congestion and hearing loss  Eyes: Negative for photophobia and pain  Respiratory: Negative for cough and chest tightness  Cardiovascular: Negative for chest pain and leg swelling  Gastrointestinal: Negative for abdominal pain, diarrhea and nausea  Endocrine: Negative for polydipsia and polyphagia     Genitourinary: Negative for dysuria and frequency  Musculoskeletal: Positive for gait problem  Negative for arthralgias  Skin: Negative for pallor and rash  Allergic/Immunologic: Negative for environmental allergies and food allergies  Neurological: Negative for dizziness and headaches  Psychiatric/Behavioral: Negative for agitation and confusion  Physical Exam  Physical Exam  Vitals signs and nursing note reviewed  Constitutional:       Appearance: She is well-developed  HENT:      Head: Normocephalic and atraumatic  Right Ear: External ear normal       Left Ear: External ear normal       Nose: Nose normal    Eyes:      Conjunctiva/sclera: Conjunctivae normal       Pupils: Pupils are equal, round, and reactive to light  Neck:      Musculoskeletal: Normal range of motion and neck supple  Cardiovascular:      Rate and Rhythm: Normal rate and regular rhythm  Pulmonary:      Effort: Pulmonary effort is normal       Breath sounds: Normal breath sounds  Abdominal:      General: Bowel sounds are normal       Palpations: Abdomen is soft  Tenderness: There is no abdominal tenderness  Musculoskeletal: Normal range of motion  Feet:    Skin:     General: Skin is warm  Neurological:      Mental Status: She is alert     Psychiatric:         Behavior: Behavior normal          Vital Signs  ED Triage Vitals [05/24/21 1526]   Temperature Pulse Respirations Blood Pressure SpO2   98 7 °F (37 1 °C) 92 18 121/76 98 %      Temp Source Heart Rate Source Patient Position - Orthostatic VS BP Location FiO2 (%)   Tympanic Monitor Sitting Right arm --      Pain Score       7           Vitals:    05/24/21 1526   BP: 121/76   Pulse: 92   Patient Position - Orthostatic VS: Sitting         Visual Acuity      ED Medications  Medications - No data to display    Diagnostic Studies  Results Reviewed     None                 No orders to display              Procedures  Procedures         ED Course  ED Course as of May 24 1705   Mon with photoprotection.      Physical Exam:  Vitals: There were no vitals taken for this visit.  SKIN: Total skin excluding the genitalia areas was performed. The exam included the scalp, face, neck, bilateral arms, chest, back, abdomen, bilateral legs, digits, mons pubis, buttocks, and nails.   - Weston II.  - The left temporal scalp demonstrates a mobile, well demarcated nodule, consistent with an pilar cyst.   - Multiple tan/brown macules and papules scattered throughout exam, consistent with benign nevi. No concerning features on dermoscopy.   - Scattered tan, homogenous macules scattered on sun exposed skin, consistent with solar lentigines.   - Scattered waxy, stuck on appearing papules and patches, consistent with seborrheic keratoses.    - Several 1-2 mm red dome shaped symmetric papules, consistent with cherry angiomas.     Medications:  Current Outpatient Medications   Medication Sig Dispense Refill     triamcinolone (KENALOG) 0.1 % external cream Apply to AA BID x 1-2 week then PRN only 454 g 0     No current facility-administered medications for this visit.      Past Medical History:   Patient Active Problem List   Diagnosis     harper JOINT PAIN-LOWER LEG     CARDIOVASCULAR SCREENING; LDL GOAL LESS THAN 160     ASCUS of cervix with negative high risk HPV     Obesity     Past Medical History:   Diagnosis Date     ASCUS with positive high risk HPV 04/2014    +HPV33/45, colp neg     Cervical high risk HPV (human papillomavirus) test positive 9/2015, 04/4/19    NIL pap, + HPV (not 16 or 18)     Elevated blood pressure 2010    borderline HBP. always higher in office!       CC Referred Self, MD  No address on file on close of this encounter.      Again, thank you for allowing me to participate in the care of your patient.        Sincerely,        Jazzmine Elias PA-C   May 24, 2021   6676 Splint application:  Ortho Glass posterior OCL Splint was applied, Applied by technician, good position, neurovascular tendon intact, good capillary refill  Evaluated by me prior to discharge  SBIRT 20yo+      Most Recent Value   SBIRT (22 yo +)   In order to provide better care to our patients, we are screening all of our patients for alcohol and drug use  Would it be okay to ask you these screening questions? No Filed at: 05/24/2021 1528                    [facility]    Disposition  Final diagnoses: Ankle fracture, right     Time reflects when diagnosis was documented in both MDM as applicable and the Disposition within this note     Time User Action Codes Description Comment    5/24/2021  3:50  [address] [[id number]] Ankle fracture, right       ED Disposition     ED Disposition Condition Date/Time Comment    Discharge Stable Mon May 24, 2021  3:49  [facility] discharge to home/self care  Follow-up Information     Follow up With Specialties Details Why Contact Info Additional [address] [facility] Schedule an appointment as soon as possible for a visit   [address] [facility]  [place] [address] [facility] [address]  [address] [facility] [place], [address] [facility], [address] [place], [place], [address] [phone]          Discharge Medication List as of 5/24/2021  3:51 PM      CONTINUE these medications which have NOT CHANGED    Details   ibuprofen (MOTRIN) 600 mg tablet Take 1 tablet (600 mg total) by mouth every 6 (six) hours as needed for mild pain, Starting Sun 5/23/2021, Print           No discharge procedures on file      PDMP Review     None          ED Provider  Electronically Signed by           Lori Naranjo PA-C  05/24/21 0377

## 2024-10-21 ENCOUNTER — TELEPHONE (OUTPATIENT)
Dept: DERMATOLOGY | Facility: CLINIC | Age: 49
End: 2024-10-21
Payer: COMMERCIAL

## 2024-10-21 NOTE — TELEPHONE ENCOUNTER
[facility] Call Center    Phone Message    May a detailed message be left on voicemail: yes     Reason for Call: Other: Pt is calling to see if her cyst removal can be scheduled sooner than the next available, please call back thanks!     Action Taken: Other: [facility] DERM    Travel Screening: Not Applicable     Date of Service:

## 2024-10-21 NOTE — TELEPHONE ENCOUNTER
S/w pt who states she needed to cancel appt for pilar cyst removal on 10/24 and was told the next available was 12/31 at 2:45 pm.  Nurse advised Jazzmine's first available procedure spot at  is Thursday 10/31 or 11/14 at 1:15 pm.  Pt scheduled for 10/31.    Eleanor MCKEON RN  [facility] Dermat[facility] [place]  [phone]

## 2024-10-31 ENCOUNTER — OFFICE VISIT (OUTPATIENT)
Dept: DERMATOLOGY | Facility: CLINIC | Age: 49
End: 2024-10-31
Payer: COMMERCIAL

## 2024-10-31 DIAGNOSIS — D49.2 NEOPLASM OF UNSPECIFIED BEHAVIOR OF BONE, SOFT TISSUE, AND SKIN: ICD-10-CM

## 2024-10-31 PROCEDURE — 11404 EXC TR-EXT B9+MARG 3.1-4 CM: CPT | Performed by: PHYSICIAN ASSISTANT

## 2024-10-31 PROCEDURE — 12032 INTMD RPR S/A/T/EXT 2.6-7.5: CPT | Performed by: PHYSICIAN ASSISTANT

## 2024-10-31 PROCEDURE — 88304 TISSUE EXAM BY PATHOLOGIST: CPT | Performed by: PATHOLOGY

## 2024-10-31 NOTE — PROGRESS NOTES
Dermatology Problem List:  # Pilar cyst  - excision 10/31/2024     NAME OF PROCEDURE: Excision intermediate layered linear closure  Staff surgeon: Jazzmine Elias PA-C  Scrub Nurse: Nilda Cordero MA    PRE-OPERATIVE DIAGNOSIS:  Pilar cyst  POST-OPERATIVE DIAGNOSIS: Same   FINAL EXCISION SIZE(DEFECT SIZE): 4.0 x 3.5 cm, with 0 mm margin   FINAL REPAIR LENGTH: 3 cm     INDICATIONS: This patient presented with a 4.0 x 3.5 cm pilar cyst of the right temporal scalp. Excision was indicated. We discussed the principles of treatment and most likely complications including scarring, bleeding, infection, incomplete excision, wound dehiscence, pain, nerve damage, and recurrence. Informed consent was obtained and the patient underwent the procedure as follows:    PROCEDURE: Time-out was performed.  The treatment area was anesthetized with 1% lidocaine with epinephrine. The area was prepped with alcohol. The lesion was delineated and excised down to subcutaneous fat in a elliptical manner.     REPAIR: An intermediate layered linear closure utilizing deep layering suturing was selected as the repair which would maximally preserve both function and cosmesis.    After the excision of the tumor, the area was carefully undermined. Hemostasis was obtained with electrocoagulation.  Closure was oriented so that the wound was in the patient's natural skin tension lines. The subcutaneous and dermal layers were then closed with 4-0 vicryl sutures. The epidermis was then carefully approximated along the length of the wound using 4-0 prolene simple running sutures.     The final wound length was 3 cm. A total of 2 ml of anesthesia was administered for all surgical sites. Estimated blood loss was less than 10 ml for all surgical sites. A sterile pressure dressing was applied and wound care instructions, with a written handout, were given. The patient was discharged alert and ambulatory.      Anatomic Pathology Results: pending    Clinical  Follow-Up: as needed     Staff Involved:  Staff Only  All risks, benefits and alternatives were discussed with patient.  Patient is in agreement and understands the assessment and plan.  All questions were answered.

## 2024-10-31 NOTE — PATIENT INSTRUCTIONS
Excision/Mohs Wound Care Instructions  I will experience scar, altered skin color, bleeding, swelling, pain, crusting and redness. I may experience altered sensation. Risks are excessive bleeding, infection, muscle weakness, thick (hypertrophic or keloidal) scar, and recurrence. A second procedure may be recommended to obtain the best cosmetic or functional result.  Possible complications of any surgical procedure are bleeding, infection, scarring, alteration in skin color and sensation, muscle weakness in the area, wound dehiscence or seperation, or recurrence of the lesion or disease. On occasion, after healing, a secondary procedure or revision may be recommended in order to obtain the best cosmetic or functional result.   After your surgery, a pressure bandage will be placed over the area that has sutures. This will help prevent bleeding. Please follow these instructions until you come back to clinic for suture removal in 7 days, as they will help you to prevent complications as your wound heals.  For the First 48 hours After Surgery:  Leave the pressure bandage on and keep it dry. If it should come loose, you may retape it, but do not take it off.  Relax and take it easy. Do not do any vigorous exercise, heavy lifting, or bending forward. This could cause the wound to bleed.  Post-operative pain is usually mild. You may alternate between 1000 mg of Tylenol (acetaminophen) and 400 mg of Ibuprofen every 4 hours.  Do not take more than 4,000mg of acetaminophen in a 24 hour period or 3200 mg of Ibuprofen in a 24 hr period.  Avoid alcohol and vitamin E as these may increase your tendency to bleed.  You may put an ice pack around the bandaged area for 20 minutes every 2-3 hours. This may help reduce swelling, bruising, and pain. Make sure the ice pack is waterproof so that the pressure bandage does not get wet.   You may see a small amount of drainage or blood on your pressure bandage. This is normal. However,  if drainage or bleeding continues or saturates the bandage, you will need to apply firm pressure over the bandage with a washcloth for 15 minutes. If bleeding continues after applying pressure for 15 minutes then go to the nearest emergency room.  48 Hours After Surgery  Carefully remove the bandage and start daily wound care and dressing changes. You may also now shower and get the wound wet.  Daily Wound Care:  Wash wound with a mild soap and water.  Use caution when washing the wound, be gentle and do not let the forceful shower stream hit the wound directly.  Pat dry.  Apply Vaseline or Aquaphor  (from a new container or tube) over the suture line with a Q-tip. It is very important to keep the wound continuously moist, as wounds heal best in a moist environment.  Keep the site covered until sutures have either been removed in 14 days.  You can cover it with a Telfa (non-stick) dressing and tape or a band-aid.    If you are unable to keep wound covered, you must apply Vaseline every 2-3 hours (while awake) to ensure it is being kept moist for optimal healing. A dressing overnight is recommended to keep the area moist.  Call Us If:  You have pain that is not controlled with Tylenol/Ibuprofen  You have signs or symptoms of an infection, such as: fever over 100 degrees F, redness, warmth, or foul-smelling or yellow drainage from the wound.  Who should I call with questions?  [facility]: [phone]  For urgent needs outside of business hours call the [facility] at [phone] and ask to speak with the dermatology resident on call   Proper skin care from [facility]:    -Eliminate harsh soaps as they strip the natural oils from the skin, often resulting in dry itchy skin ( i.e. Dial, Zest, Malagasy Spring)  -Use mild soaps such as Cetaphil or Dove Sensitive Skin in the shower. You do not need to use soap on arms, legs, and trunk every time you shower unless visibly  soiled.   -Avoid hot or cold showers.  -After showering, lightly dry off and apply moisturizing within 2-3 minutes. This will help trap moisture in the skin.   -Aggressive use of a moisturizer at least 1-2 times a day to the entire body (including -Vanicream, Cetaphil, Aquaphor or Cerave) and moisturize hands after every washing.  -We recommend using moisturizers that come in a tub that needs to be scooped out, not a pump. This has more of an oil base. It will hold moisture in your skin much better than a water base moisturizer. The above recommended are non-pore clogging.      Wear a sunscreen with at least SPF 30 on your face, ears, neck and V of the chest daily. Wear sunscreen on other areas of the body if those areas are exposed to the sun throughout the day. Sunscreens can contain physical and/or chemical blockers. Physical blockers are less likely to clog pores, these include zinc oxide and titanium dioxide. Reapply every two hour and after swimming.     Sunscreen examples: https://www.ewg.org/sunscreen/    UV radiation  UVA radiation remains constant throughout the day and throughout the year. It is a longer wavelength than UVB and therefore penetrates deeper into the skin leading to immediate and delayed tanning, photoaging, and skin cancer. 70-80% of UVA and UVB radiation occurs between the hours of 10am-2pm.  UVB radiation  UVB radiation causes the most harmful effects and is more significant during the summer months. However, snow and ice can reflect UVB radiation leading to skin damage during the winter months as well. UVB radiation is responsible for tanning, burning, inflammation, delayed erythema (pinkness), pigmentation (brown spots), and skin cancer.     I recommend self monthly full body exams and yearly full body exams with a dermatology provider. If you develop a new or changing lesion please follow up for examination. Most skin cancers are pink and scaly or pink and pearly. However, we do see  blue/brown/black skin cancers.  Consider the ABCDEs of melanoma when giving yourself your monthly full body exam ( don't forget the groin, buttocks, feet, toes, etc). A-asymmetry, B-borders, C-color, D-diameter, E-elevation or evolving. If you see any of these changes please follow up in clinic. If you cannot see your back I recommend purchasing a hand held mirror to use with a larger wall mirror.       Checking for Skin Cancer  You can find cancer early by checking your skin each month. There are 3 kinds of skin cancer. They are melanoma, basal cell carcinoma, and squamous cell carcinoma. Doing monthly skin checks is the best way to find new marks or skin changes. Follow the instructions below for checking your skin.   The ABCDEs of checking moles for melanoma   Check your moles or growths for signs of melanoma using ABCDE:   Asymmetry: the sides of the mole or growth don t match  Border: the edges are ragged, notched, or blurred  Color: the color within the mole or growth varies  Diameter: the mole or growth is larger than 6 mm (size of a pencil eraser)  Evolving: the size, shape, or color of the mole or growth is changing (evolving is not shown in the images below)    Checking for other types of skin cancer  Basal cell carcinoma or squamous cell carcinoma have symptoms such as:     A spot or mole that looks different from all other marks on your skin  Changes in how an area feels, such as itching, tenderness, or pain  Changes in the skin's surface, such as oozing, bleeding, or scaliness  A sore that does not heal  New swelling or redness beyond the border of a mole    Who s at risk?  Anyone can get skin cancer. But you are at greater risk if you have:   Fair skin, light-colored hair, or light-colored eyes  Many moles or abnormal moles on your skin  A history of sunburns from sunlight or tanning beds  A family history of skin cancer  A history of exposure to radiation or chemicals  A weakened immune system  If you  have had skin cancer in the past, you are at risk for recurring skin cancer.   How to check your skin  Do your monthly skin checkups in front of a full-length mirror. Check all parts of your body, including your:   Head (ears, face, neck, and scalp)  Torso (front, back, and sides)  Arms (tops, undersides, upper, and lower armpits)  Hands (palms, backs, and fingers, including under the nails)  Buttocks and genitals  Legs (front, back, and sides)  Feet (tops, soles, toes, including under the nails, and between toes)  If you have a lot of moles, take digital photos of them each month. Make sure to take photos both up close and from a distance. These can help you see if any moles change over time.   Most skin changes are not cancer. But if you see any changes in your skin, call your doctor right away. Only he or she can diagnose a problem. If you have skin cancer, seeing your doctor can be the first step toward getting the treatment that could save your life.   YR.[email or web address] last reviewed this educational content on 4/1/2019 2000-2020 [employer]. [address] [place] [address]. All rights reserved. This information is not intended as a substitute for professional medical care. Always follow your healthcare professional's instructions.       When should I call my doctor?  If you are worsening or not improving, please, contact us or seek urgent care as noted below.     Who should I call with questions (adults)?    [facility] [phone]  For urgent needs outside of business hours call the [facility] at [phone] and ask for the dermatology resident on call to be paged  If this is a medical emergency and you are unable to reach an ER, Call [phone]      If you need a prescription refill, please contact your pharmacy. Refills are approved or denied by our Physicians during normal business hours, Monday through Fridays  Per office policy, refills will not be  granted if you have not been seen within the past year (or sooner depending on your child's condition)

## 2024-10-31 NOTE — LETTER
10/31/2024      Long Maradiaga  [address] [place] [address]      Dear Colleague,    Thank you for referring your patient, Long Maradiaga, to the [facility]. Please see a copy of my visit note below.    Dermatology Problem List:  # Pilar cyst  - excision 10/31/2024     NAME OF PROCEDURE: Excision intermediate layered linear closure  Staff surgeon: Jazzmine Elias PA-C  Scrub Nurse: Nilda Cordero MA    PRE-OPERATIVE DIAGNOSIS:  Pilar cyst  POST-OPERATIVE DIAGNOSIS: Same   FINAL EXCISION SIZE(DEFECT SIZE): 4.0 x 3.5 cm, with 0 mm margin   FINAL REPAIR LENGTH: 3 cm     INDICATIONS: This patient presented with a 4.0 x 3.5 cm pilar cyst of the right temporal scalp. Excision was indicated. We discussed the principles of treatment and most likely complications including scarring, bleeding, infection, incomplete excision, wound dehiscence, pain, nerve damage, and recurrence. Informed consent was obtained and the patient underwent the procedure as follows:    PROCEDURE: Time-out was performed.  The treatment area was anesthetized with 1% lidocaine with epinephrine. The area was prepped with alcohol. The lesion was delineated and excised down to subcutaneous fat in a elliptical manner.     REPAIR: An intermediate layered linear closure utilizing deep layering suturing was selected as the repair which would maximally preserve both function and cosmesis.    After the excision of the tumor, the area was carefully undermined. Hemostasis was obtained with electrocoagulation.  Closure was oriented so that the wound was in the patient's natural skin tension lines. The subcutaneous and dermal layers were then closed with 4-0 vicryl sutures. The epidermis was then carefully approximated along the length of the wound using 4-0 prolene simple running sutures.     The final wound length was 3 cm. A total of 2 ml of anesthesia was administered for all surgical sites. Estimated blood loss was less than 10 ml  for all surgical sites. A sterile pressure dressing was applied and wound care instructions, with a written handout, were given. The patient was discharged alert and ambulatory.      Anatomic Pathology Results: pending    Clinical Follow-Up: as needed     Staff Involved:  Staff Only  All risks, benefits and alternatives were discussed with patient.  Patient is in agreement and understands the assessment and plan.  All questions were answered.      Again, thank you for allowing me to participate in the care of your patient.        Sincerely,        Jazzmine Elias PA-C

## 2024-11-04 ENCOUNTER — TELEPHONE (OUTPATIENT)
Dept: DERMATOLOGY | Facility: CLINIC | Age: 49
End: 2024-11-04
Payer: COMMERCIAL

## 2024-11-04 NOTE — TELEPHONE ENCOUNTER
[facility] Call Center    Phone Message    May a detailed message be left on voicemail: yes     Reason for Call: Other: pt would like to schedule to get her stitches removed. Pt thought she was able to get help and now she is unable to. Pt will need to come in Thursday or Friday. Please call pt back at [phone]. Thanks       Action Taken: Message routed to:  Other: EP - Derm    Travel Screening: Not Applicable     Date of Service:

## 2024-11-04 NOTE — TELEPHONE ENCOUNTER
S/w pt and scheduled suture removal for Thursday November 7th at 11:45 am at  [facility].    Pt had pilar cyst removed from right temporal scalp on 10/31/24.    Eleanor MCKEON RN  [facility] [place]  [phone]

## 2024-11-07 ENCOUNTER — ALLIED HEALTH/NURSE VISIT (OUTPATIENT)
Dept: DERMATOLOGY | Facility: CLINIC | Age: 49
End: 2024-11-07
Payer: COMMERCIAL

## 2024-11-07 DIAGNOSIS — Z48.02 VISIT FOR SUTURE REMOVAL: Primary | ICD-10-CM

## 2024-11-07 PROCEDURE — 99207 PR NO CHARGE LOS: CPT

## 2024-11-07 NOTE — PATIENT INSTRUCTIONS
Proper skin care from [facility]:    -Eliminate harsh soaps as they strip the natural oils from the skin, often resulting in dry itchy skin ( i.e. Dial, Zest, [place] citizen Spring)  -Use mild soaps such as Cetaphil or Dove Sensitive Skin in the shower. You do not need to use soap on arms, legs, and trunk every time you shower unless visibly soiled.   -Avoid hot or cold showers.  -After showering, lightly dry off and apply moisturizing within 2-3 minutes. This will help trap moisture in the skin.   -Aggressive use of a moisturizer at least 1-2 times a day to the entire body (including -Vanicream, Cetaphil, Aquaphor or Cerave) and moisturize hands after every washing.  -We recommend using moisturizers that come in a tub that needs to be scooped out, not a pump. This has more of an oil base. It will hold moisture in your skin much better than a water base moisturizer. The above recommended are non-pore clogging.      Wear a sunscreen with at least SPF 30 on your face, ears, neck and V of the chest daily. Wear sunscreen on other areas of the body if those areas are exposed to the sun throughout the day. Sunscreens can contain physical and/or chemical blockers. Physical blockers are less likely to clog pores, these include zinc oxide and titanium dioxide. Reapply every two hour and after swimming.     Sunscreen examples: https://www.ewg.org/sunscreen/    UV radiation  UVA radiation remains constant throughout the day and throughout the year. It is a longer wavelength than UVB and therefore penetrates deeper into the skin leading to immediate and delayed tanning, photoaging, and skin cancer. 70-80% of UVA and UVB radiation occurs between the hours of 10am-2pm.  UVB radiation  UVB radiation causes the most harmful effects and is more significant during the summer months. However, snow and ice can reflect UVB radiation leading to skin damage during the winter months as well. UVB radiation is responsible for tanning,  burning, inflammation, delayed erythema (pinkness), pigmentation (brown spots), and skin cancer.     I recommend self monthly full body exams and yearly full body exams with a dermatology provider. If you develop a new or changing lesion please follow up for examination. Most skin cancers are pink and scaly or pink and pearly. However, we do see blue/brown/black skin cancers.  Consider the ABCDEs of melanoma when giving yourself your monthly full body exam ( don't forget the groin, buttocks, feet, toes, etc). A-asymmetry, B-borders, C-color, D-diameter, E-elevation or evolving. If you see any of these changes please follow up in clinic. If you cannot see your back I recommend purchasing a hand held mirror to use with a larger wall mirror.       Checking for Skin Cancer  You can find cancer early by checking your skin each month. There are 3 kinds of skin cancer. They are melanoma, basal cell carcinoma, and squamous cell carcinoma. Doing monthly skin checks is the best way to find new marks or skin changes. Follow the instructions below for checking your skin.   The ABCDEs of checking moles for melanoma   Check your moles or growths for signs of melanoma using ABCDE:   Asymmetry: the sides of the mole or growth don t match  Border: the edges are ragged, notched, or blurred  Color: the color within the mole or growth varies  Diameter: the mole or growth is larger than 6 mm (size of a pencil eraser)  Evolving: the size, shape, or color of the mole or growth is changing (evolving is not shown in the images below)    Checking for other types of skin cancer  Basal cell carcinoma or squamous cell carcinoma have symptoms such as:     A spot or mole that looks different from all other marks on your skin  Changes in how an area feels, such as itching, tenderness, or pain  Changes in the skin's surface, such as oozing, bleeding, or scaliness  A sore that does not heal  New swelling or redness beyond the border of a  mole    Who s at risk?  Anyone can get skin cancer. But you are at greater risk if you have:   Fair skin, light-colored hair, or light-colored eyes  Many moles or abnormal moles on your skin  A history of sunburns from sunlight or tanning beds  A family history of skin cancer  A history of exposure to radiation or chemicals  A weakened immune system  If you have had skin cancer in the past, you are at risk for recurring skin cancer.   How to check your skin  Do your monthly skin checkups in front of a full-length mirror. Check all parts of your body, including your:   Head (ears, face, neck, and scalp)  Torso (front, back, and sides)  Arms (tops, undersides, upper, and lower armpits)  Hands (palms, backs, and fingers, including under the nails)  Buttocks and genitals  Legs (front, back, and sides)  Feet (tops, soles, toes, including under the nails, and between toes)  If you have a lot of moles, take digital photos of them each month. Make sure to take photos both up close and from a distance. These can help you see if any moles change over time.   Most skin changes are not cancer. But if you see any changes in your skin, call your doctor right away. Only he or she can diagnose a problem. If you have skin cancer, seeing your doctor can be the first step toward getting the treatment that could save your life.   [employer] last reviewed this educational content on 4/1/2019 2000-2020 [employer]. [address] [place] [address], [place] [address]. All rights reserved. This information is not intended as a substitute for professional medical care. Always follow your healthcare professional's instructions.       When should I call my doctor?  If you are worsening or not improving, please, contact us or seek urgent care as noted below.     Who should I call with questions (adults)?    [facility] [phone]  For urgent needs outside of business hours call the [facility] at  [phone] and ask for the dermatology resident on call to be paged  If this is a medical emergency and you are unable to reach an ER, Call 911      If you need a prescription refill, please contact your pharmacy. Refills are approved or denied by our Physicians during normal business hours, Monday through Fridays  Per office policy, refills will not be granted if you have not been seen within the past year (or sooner depending on your child's condition)    WOUND CARE INSTRUCTIONS  for  SUTURE REMOVAL    [phone]      If there are any open or bleeding areas at the incision site you should begin to cover the area with a bandage daily as follows:    Clean and dry the area with plain tap water using a Q-tip or sterile gauze pad.  Apply Vaseline, Aquaphor, Polysporin or Bacitracin ointment to the open area.  Cover the wound with a band-aid or a sterile non-stick gauze pad and micropore paper tape.       *Once the bandages are removed, the scar will be red and firm (especially in the lip/chin area). This is normal and will fade in time. It might take 6-12 months for this to happen.     *Massaging the area will help the scar soften and fade quicker. Begin to massage the area in one month. To massage apply pressure directly and firmly over the scar with the fingertips and move in a circular motion. Massage the area for a few minutes several times a day. Continue to massage the site for several months.    *Numbness in the surgical area is expected. It might take 12-18 months for the feeling to return to normal. During this time sensations of itchiness, tingling and occasional sharp pains might be noted. These feelings are normal and will subside once the nerves have completely healed.

## 2024-11-07 NOTE — PROGRESS NOTES
Long Maradiaga presents to the clinic today for removal of sutures.  The patient has had the sutures in place for 7 days.  There has been no history of infection or drainage.  1 sutures are seen located on the right temporal scalp.  The wound is healing well with no signs of infection.  Tetanus status is up to date.   All sutures were easily removed today.  Routine wound care discussed.  The patient will follow up as needed.     Long Maradiaga comes into clinic today at the request of Jazzmine Elias Ordering Provider for Suture Removal:  Incision was dry, clean and intact, incision cleansed with wound cleanser and sutures were removed. Pt tolerated the procedure. Benzoin and steristrips were not placed per protocol and pt was instructed to leave them in place for 7-10 days. It is okay to shower with these in place, no need to cover. After this time the strerstrips will start loosen and should be removed.  . .    LOV 10/31/24    This service provided today was under the supervising provider of the day Jazzmine Elias, who was available if needed.    Eleanor MCKEON RN  [facility] [place]  [phone]

## 2025-01-04 ENCOUNTER — HEALTH MAINTENANCE LETTER (OUTPATIENT)
Age: 50
End: 2025-01-04

## 2025-03-14 NOTE — PROGRESS NOTES
The results of your recent lipid (cholesterol) profile were abnormal.    Here are the results:  Lab Results       Component                Value               Date                       CHOL                     187                 03/10/2017            Lab Results       Component                Value               Date                       HDL                      39                  03/10/2017            Lab Results       Component                Value               Date                       LDL                      109                 03/10/2017            Lab Results       Component                Value               Date                       TRIG                     196                 03/10/2017            Lab Results       Component                Value               Date                       CHOLHDLRATIO             4.0                 03/24/2015              Desired or goal levels are:  CHOLESTEROL: Desirable is less than 200.   HDL (Good Cholesterol): Desirable is greater than 40 (for men) greater than 50 (for women).  LDL (Bad Cholesterol): Desirable is less than 130 (or less than 100 if you have heart disease or diabetes). Borderline 130-160.  TRIGLYCERIDES: Desirable is less than 150.  Borderline is 150-200.    For high triglycerides, reduce the intake of jam, jelly, honey, alcohol, and/or coffee creamers  Your HDL (the good cholesterol) level is low, no medication is required at this point. Reducing your total fat intake, increasing exercise level, reducing weight, adding olive oil to your diet, etc, may help to increase this level..    As you may know, an elevated cholesterol is one factor that increases your risk for heart disease and stroke. You can improve your cholesterol by controlling the amount and type of fat you eat and by increasing your daily activity level.    Here are some ways to improve your nutrition:  Eat less fat (especially butter, Crisco and other saturated fats)  Buy lean cuts  of meat, reduce your portions of red meat or substitute poultry or fish  Use skim milk and low-fat dairy products  Eat no more than 4 egg yolks per week  Avoid fried or fast foods that are high in fat  Eat more fruits and vegetables      Also consider starting or increasing your aerobic activity. Aerobic activity is the best way to improve HDL (good) cholesterol. If this would be new to you, please talk with me first about what activities are safe for you.      Other lab results:    Your glucose (blood sugar) was normal.     Please feel free to contact us with any questions or if you would like more information.    Hailey Chiang M.D.                          hair removal not indicated

## 2025-08-21 PROCEDURE — 87624 HPV HI-RISK TYP POOLED RSLT: CPT | Performed by: OBSTETRICS & GYNECOLOGY
